# Patient Record
Sex: FEMALE | Race: WHITE | Employment: FULL TIME | URBAN - METROPOLITAN AREA
[De-identification: names, ages, dates, MRNs, and addresses within clinical notes are randomized per-mention and may not be internally consistent; named-entity substitution may affect disease eponyms.]

---

## 2017-11-29 PROBLEM — I10 BENIGN ESSENTIAL HTN: Status: ACTIVE | Noted: 2017-11-29

## 2017-12-09 PROCEDURE — 81003 URINALYSIS AUTO W/O SCOPE: CPT | Performed by: INTERNAL MEDICINE

## 2017-12-09 PROCEDURE — 86803 HEPATITIS C AB TEST: CPT | Performed by: INTERNAL MEDICINE

## 2018-01-15 PROCEDURE — 88305 TISSUE EXAM BY PATHOLOGIST: CPT | Performed by: INTERNAL MEDICINE

## 2018-02-06 PROBLEM — R30.9 PAIN WITH URINATION: Status: ACTIVE | Noted: 2018-02-06

## 2018-02-06 PROCEDURE — 87086 URINE CULTURE/COLONY COUNT: CPT | Performed by: UROLOGY

## 2018-03-08 PROBLEM — S42.292A OTHER CLOSED DISPLACED FRACTURE OF PROXIMAL END OF LEFT HUMERUS, INITIAL ENCOUNTER: Status: ACTIVE | Noted: 2018-03-08

## 2018-03-09 RX ORDER — MULTIVITAMIN WITH IRON
100 TABLET ORAL DAILY
COMMUNITY
End: 2020-11-04 | Stop reason: ALTCHOICE

## 2018-03-13 ENCOUNTER — ANESTHESIA EVENT (OUTPATIENT)
Dept: SURGERY | Facility: HOSPITAL | Age: 58
End: 2018-03-13
Payer: COMMERCIAL

## 2018-03-13 ENCOUNTER — APPOINTMENT (OUTPATIENT)
Dept: GENERAL RADIOLOGY | Facility: HOSPITAL | Age: 58
End: 2018-03-13
Attending: ORTHOPAEDIC SURGERY
Payer: COMMERCIAL

## 2018-03-13 ENCOUNTER — ANESTHESIA (OUTPATIENT)
Dept: SURGERY | Facility: HOSPITAL | Age: 58
End: 2018-03-13
Payer: COMMERCIAL

## 2018-03-13 ENCOUNTER — HOSPITAL ENCOUNTER (OUTPATIENT)
Facility: HOSPITAL | Age: 58
Setting detail: HOSPITAL OUTPATIENT SURGERY
Discharge: HOME | End: 2018-03-13
Attending: ORTHOPAEDIC SURGERY | Admitting: ORTHOPAEDIC SURGERY
Payer: COMMERCIAL

## 2018-03-13 ENCOUNTER — SURGERY (OUTPATIENT)
Age: 58
End: 2018-03-13

## 2018-03-13 VITALS
HEART RATE: 94 BPM | DIASTOLIC BLOOD PRESSURE: 89 MMHG | RESPIRATION RATE: 20 BRPM | TEMPERATURE: 98 F | BODY MASS INDEX: 35.55 KG/M2 | HEIGHT: 63 IN | SYSTOLIC BLOOD PRESSURE: 143 MMHG | OXYGEN SATURATION: 100 % | WEIGHT: 200.63 LBS

## 2018-03-13 DIAGNOSIS — S42.292A OTHER CLOSED DISPLACED FRACTURE OF PROXIMAL END OF LEFT HUMERUS, INITIAL ENCOUNTER: ICD-10-CM

## 2018-03-13 PROCEDURE — 76942 ECHO GUIDE FOR BIOPSY: CPT | Performed by: ORTHOPAEDIC SURGERY

## 2018-03-13 PROCEDURE — 0PSD04Z REPOSITION LEFT HUMERAL HEAD WITH INTERNAL FIXATION DEVICE, OPEN APPROACH: ICD-10-PCS | Performed by: ORTHOPAEDIC SURGERY

## 2018-03-13 PROCEDURE — 3E0T3BZ INTRODUCTION OF ANESTHETIC AGENT INTO PERIPHERAL NERVES AND PLEXI, PERCUTANEOUS APPROACH: ICD-10-PCS | Performed by: ANESTHESIOLOGY

## 2018-03-13 PROCEDURE — 76001 XR FLUOROSCOPE EXAM >1 HR EXTENSIVE (CPT=76001): CPT | Performed by: ORTHOPAEDIC SURGERY

## 2018-03-13 DEVICE — SCREW LOCKING 3.5X30 212.111: Type: IMPLANTABLE DEVICE | Site: HUMERUS | Status: FUNCTIONAL

## 2018-03-13 DEVICE — SCREW LOCKING 3.5X26 212.109: Type: IMPLANTABLE DEVICE | Site: HUMERUS | Status: FUNCTIONAL

## 2018-03-13 DEVICE — SCREW LOCKING 3.5X50 212.121: Type: IMPLANTABLE DEVICE | Site: HUMERUS | Status: FUNCTIONAL

## 2018-03-13 DEVICE — IMPLANTABLE DEVICE: Type: IMPLANTABLE DEVICE | Site: HUMERUS | Status: FUNCTIONAL

## 2018-03-13 DEVICE — SCREW LOCKING 3.5X40 212.117: Type: IMPLANTABLE DEVICE | Site: HUMERUS | Status: FUNCTIONAL

## 2018-03-13 DEVICE — SCREW LOCKING 3.5X36 212.115: Type: IMPLANTABLE DEVICE | Site: HUMERUS | Status: FUNCTIONAL

## 2018-03-13 DEVICE — SCREW LOCKING 3.5X28 212.110: Type: IMPLANTABLE DEVICE | Site: HUMERUS | Status: FUNCTIONAL

## 2018-03-13 RX ORDER — SODIUM CHLORIDE, SODIUM LACTATE, POTASSIUM CHLORIDE, CALCIUM CHLORIDE 600; 310; 30; 20 MG/100ML; MG/100ML; MG/100ML; MG/100ML
INJECTION, SOLUTION INTRAVENOUS CONTINUOUS
Status: DISCONTINUED | OUTPATIENT
Start: 2018-03-13 | End: 2018-03-13

## 2018-03-13 RX ORDER — ONDANSETRON 2 MG/ML
4 INJECTION INTRAMUSCULAR; INTRAVENOUS AS NEEDED
Status: DISCONTINUED | OUTPATIENT
Start: 2018-03-13 | End: 2018-03-13

## 2018-03-13 RX ORDER — CEFAZOLIN SODIUM/WATER 2 G/20 ML
2 SYRINGE (ML) INTRAVENOUS ONCE
Status: DISCONTINUED | OUTPATIENT
Start: 2018-03-13 | End: 2018-03-13 | Stop reason: HOSPADM

## 2018-03-13 RX ORDER — HYDROCODONE BITARTRATE AND ACETAMINOPHEN 5; 325 MG/1; MG/1
1 TABLET ORAL AS NEEDED
Status: DISCONTINUED | OUTPATIENT
Start: 2018-03-13 | End: 2018-03-13

## 2018-03-13 RX ORDER — HYDROCODONE BITARTRATE AND ACETAMINOPHEN 5; 325 MG/1; MG/1
2 TABLET ORAL AS NEEDED
Status: DISCONTINUED | OUTPATIENT
Start: 2018-03-13 | End: 2018-03-13

## 2018-03-13 RX ORDER — ACETAMINOPHEN 500 MG
1000 TABLET ORAL ONCE AS NEEDED
Status: DISCONTINUED | OUTPATIENT
Start: 2018-03-13 | End: 2018-03-13

## 2018-03-13 RX ORDER — MIDAZOLAM HYDROCHLORIDE 1 MG/ML
1 INJECTION INTRAMUSCULAR; INTRAVENOUS EVERY 5 MIN PRN
Status: DISCONTINUED | OUTPATIENT
Start: 2018-03-13 | End: 2018-03-13

## 2018-03-13 RX ORDER — NALOXONE HYDROCHLORIDE 0.4 MG/ML
80 INJECTION, SOLUTION INTRAMUSCULAR; INTRAVENOUS; SUBCUTANEOUS AS NEEDED
Status: DISCONTINUED | OUTPATIENT
Start: 2018-03-13 | End: 2018-03-13

## 2018-03-13 RX ORDER — MEPERIDINE HYDROCHLORIDE 25 MG/ML
12.5 INJECTION INTRAMUSCULAR; INTRAVENOUS; SUBCUTANEOUS AS NEEDED
Status: DISCONTINUED | OUTPATIENT
Start: 2018-03-13 | End: 2018-03-13

## 2018-03-13 RX ORDER — DEXAMETHASONE SODIUM PHOSPHATE 4 MG/ML
4 VIAL (ML) INJECTION AS NEEDED
Status: DISCONTINUED | OUTPATIENT
Start: 2018-03-13 | End: 2018-03-13

## 2018-03-13 NOTE — BRIEF OP NOTE
Pre-Operative Diagnosis: Other closed displaced fracture of proximal end of left humerus, initial encounter [S42.292A]     Post-Operative Diagnosis: Other closed displaced fracture of proximal end of left humerus, initial encounter [S42.292A]     Proced

## 2018-03-13 NOTE — H&P
CHIEF COMPLAINT:  Patient presents with:  Shoulder Pain: L shoulder injured it on 03/05/18 she fell at home. Pain comes and goes Norco is helping. ROM is limited and painful. XR at [facility].          HPI: Irene Carlton is a pleasant 25-year-old right-hand-dom Diabetes Father     • Diabetes Brother             Social History      Social History  Social History    Marital status: Single   Spouse name: N/A     Years of education: N/A   Number of children: N/A      Occupational History  None on file      Social His displaced fracture of proximal end of left humerus, initial encounter  -     OFFICE/OUTPT VISIT,DIPESH IBARRA III  -     ERIKA ([facility])           Assessment:   Left surgical neck proximal humerus fracture with displacement     Plan:   I reviewed the imaging

## 2018-03-13 NOTE — ANESTHESIA PREPROCEDURE EVALUATION
PRE-OP EVALUATION    Patient Name: Park Flynn    Pre-op Diagnosis: Other closed displaced fracture of proximal end of left humerus, initial encounter [S42.327A]    Procedure(s):  OPEN REDUCTION INTERNAL FIXATION LEFT PROXIMAL HUMERUS    Surgeon(s) an Surgeon: Barbra Rubio MD;  Location: [address] [place] [address]  No date: COLONOSCOPY  2012: Hunter Salguero      Comment: Screenincm proximal transverse polyp                (hyperplastic), int hemorrhoids; next

## 2018-03-13 NOTE — ANESTHESIA POSTPROCEDURE EVALUATION
[facility] Patient Status:  Hospital Outpatient Surgery   Age/Gender 59year old female MRN [id number]   [facility] SURGERY Attending Angi Jalloh MD   Hosp Day # 0 PCP Yesenia Escobedo MD       Anesthesia Post-op Note

## 2018-03-14 PROBLEM — Z47.89 ORTHOPEDIC AFTERCARE: Status: ACTIVE | Noted: 2018-03-14

## 2018-03-14 NOTE — OPERATIVE REPORT
[address]    PATIENT'S NAME: Chavo Andersonccasin   ATTENDING PHYSICIAN: Zoila Charles MD   OPERATING PHYSICIAN: Zoila Charles MD   PATIENT ACCOUNT#:   [id number]    LOCATION:  [address] [place] [address] 10  MEDICAL RECORD #:   [id number]       DATE OF BI preoperative area and the surgical site was marked. Once again, the procedure was described to the patient along with the risks and she elected to proceed. She was brought to the OR and underwent interscalene block.   She also underwent general anesthesia of the humerus just lateral to the bicipital groove over the greater tuberosity. We then placed a shaft screw, which was a 3.5 mm nonlocking screw to affix the plate to the bone.   At this point, we used our locking guide and drilled and placed our 3.5 mm

## 2018-03-28 PROBLEM — M25.512 LEFT SHOULDER PAIN, UNSPECIFIED CHRONICITY: Status: ACTIVE | Noted: 2018-03-28

## 2018-03-28 PROBLEM — S42.292D OTHER CLOSED DISPLACED FRACTURE OF PROXIMAL END OF LEFT HUMERUS WITH ROUTINE HEALING, SUBSEQUENT ENCOUNTER: Status: ACTIVE | Noted: 2018-03-28

## 2018-06-04 PROBLEM — Z98.890 S/P ORIF (OPEN REDUCTION INTERNAL FIXATION) FRACTURE: Status: ACTIVE | Noted: 2018-06-04

## 2018-06-04 PROBLEM — Z87.81 S/P ORIF (OPEN REDUCTION INTERNAL FIXATION) FRACTURE: Status: ACTIVE | Noted: 2018-06-04

## 2021-01-08 PROBLEM — Z98.890 S/P ORIF (OPEN REDUCTION INTERNAL FIXATION) FRACTURE: Status: RESOLVED | Noted: 2018-06-04 | Resolved: 2021-01-08

## 2021-01-08 PROBLEM — S42.292A OTHER CLOSED DISPLACED FRACTURE OF PROXIMAL END OF LEFT HUMERUS, INITIAL ENCOUNTER: Status: RESOLVED | Noted: 2018-03-08 | Resolved: 2021-01-08

## 2021-01-08 PROBLEM — S42.292D OTHER CLOSED DISPLACED FRACTURE OF PROXIMAL END OF LEFT HUMERUS WITH ROUTINE HEALING, SUBSEQUENT ENCOUNTER: Status: RESOLVED | Noted: 2018-03-28 | Resolved: 2021-01-08

## 2021-01-08 PROBLEM — Z87.81 S/P ORIF (OPEN REDUCTION INTERNAL FIXATION) FRACTURE: Status: RESOLVED | Noted: 2018-06-04 | Resolved: 2021-01-08

## 2021-01-08 PROBLEM — N18.30 STAGE 3 CHRONIC KIDNEY DISEASE, UNSPECIFIED WHETHER STAGE 3A OR 3B CKD (HCC): Status: ACTIVE | Noted: 2021-01-08

## 2021-01-08 PROBLEM — D64.9 MILD ANEMIA: Status: ACTIVE | Noted: 2021-01-08

## 2021-01-08 PROBLEM — Z47.89 ORTHOPEDIC AFTERCARE: Status: RESOLVED | Noted: 2018-03-14 | Resolved: 2021-01-08

## 2021-03-23 PROBLEM — D50.0 IRON DEFICIENCY ANEMIA SECONDARY TO BLOOD LOSS (CHRONIC): Status: ACTIVE | Noted: 2021-03-23

## 2021-05-24 PROBLEM — N31.9 NEUROGENIC BLADDER: Status: ACTIVE | Noted: 2021-05-24

## 2021-06-07 PROBLEM — K59.09 CHRONIC CONSTIPATION: Status: ACTIVE | Noted: 2021-06-07

## 2021-11-03 PROBLEM — Z47.89 AFTERCARE FOLLOWING SURGERY OF THE MUSCULOSKELETAL SYSTEM: Status: ACTIVE | Noted: 2021-11-03

## 2022-03-16 PROBLEM — M81.8 OTHER OSTEOPOROSIS WITHOUT CURRENT PATHOLOGICAL FRACTURE: Status: ACTIVE | Noted: 2022-03-16

## 2024-04-24 RX ORDER — ACETAMINOPHEN 500 MG
500 TABLET ORAL EVERY 6 HOURS PRN
COMMUNITY

## 2024-04-25 ENCOUNTER — ANESTHESIA EVENT (OUTPATIENT)
Dept: SURGERY | Facility: HOSPITAL | Age: 64
End: 2024-04-25
Payer: COMMERCIAL

## 2024-04-26 ENCOUNTER — APPOINTMENT (OUTPATIENT)
Dept: GENERAL RADIOLOGY | Facility: HOSPITAL | Age: 64
End: 2024-04-26
Attending: ORTHOPAEDIC SURGERY
Payer: COMMERCIAL

## 2024-04-26 ENCOUNTER — ANESTHESIA (OUTPATIENT)
Dept: SURGERY | Facility: HOSPITAL | Age: 64
End: 2024-04-26
Payer: COMMERCIAL

## 2024-04-26 ENCOUNTER — HOSPITAL ENCOUNTER (OUTPATIENT)
Facility: HOSPITAL | Age: 64
Discharge: HOME | End: 2024-04-27
Attending: ORTHOPAEDIC SURGERY | Admitting: ORTHOPAEDIC SURGERY
Payer: COMMERCIAL

## 2024-04-26 DIAGNOSIS — Z87.81 STATUS POST OPEN REDUCTION WITH INTERNAL FIXATION (ORIF) OF FRACTURE OF ANKLE: Primary | ICD-10-CM

## 2024-04-26 DIAGNOSIS — Z98.890 STATUS POST OPEN REDUCTION WITH INTERNAL FIXATION (ORIF) OF FRACTURE OF ANKLE: Primary | ICD-10-CM

## 2024-04-26 LAB — MRSA DNA SPEC QL NAA+PROBE: NEGATIVE

## 2024-04-26 PROCEDURE — 0MQR0ZZ REPAIR LEFT ANKLE BURSA AND LIGAMENT, OPEN APPROACH: ICD-10-PCS | Performed by: ORTHOPAEDIC SURGERY

## 2024-04-26 PROCEDURE — 0SSG04Z REPOSITION LEFT ANKLE JOINT WITH INTERNAL FIXATION DEVICE, OPEN APPROACH: ICD-10-PCS | Performed by: ORTHOPAEDIC SURGERY

## 2024-04-26 PROCEDURE — 64450 NJX AA&/STRD OTHER PN/BRANCH: CPT | Performed by: ORTHOPAEDIC SURGERY

## 2024-04-26 PROCEDURE — 76000 FLUOROSCOPY <1 HR PHYS/QHP: CPT | Performed by: ORTHOPAEDIC SURGERY

## 2024-04-26 PROCEDURE — 76942 ECHO GUIDE FOR BIOPSY: CPT | Performed by: ORTHOPAEDIC SURGERY

## 2024-04-26 PROCEDURE — 87641 MR-STAPH DNA AMP PROBE: CPT | Performed by: ORTHOPAEDIC SURGERY

## 2024-04-26 DEVICE — TIGHTROPE XP BUTTRESS PLATE IMPLANT SYS
Type: IMPLANTABLE DEVICE | Site: ANKLE | Status: FUNCTIONAL
Brand: ARTHREX®

## 2024-04-26 DEVICE — IMPLANTABLE DEVICE: Type: IMPLANTABLE DEVICE | Site: ANKLE | Status: FUNCTIONAL

## 2024-04-26 RX ORDER — METOCLOPRAMIDE HYDROCHLORIDE 5 MG/ML
10 INJECTION INTRAMUSCULAR; INTRAVENOUS ONCE
Status: COMPLETED | OUTPATIENT
Start: 2024-04-26 | End: 2024-04-26

## 2024-04-26 RX ORDER — DOXEPIN HYDROCHLORIDE 50 MG/1
1 CAPSULE ORAL DAILY
Status: DISCONTINUED | OUTPATIENT
Start: 2024-04-27 | End: 2024-04-27

## 2024-04-26 RX ORDER — ASPIRIN 81 MG/1
81 TABLET ORAL DAILY
Status: DISCONTINUED | OUTPATIENT
Start: 2024-04-26 | End: 2024-04-27

## 2024-04-26 RX ORDER — SODIUM CHLORIDE, SODIUM LACTATE, POTASSIUM CHLORIDE, CALCIUM CHLORIDE 600; 310; 30; 20 MG/100ML; MG/100ML; MG/100ML; MG/100ML
INJECTION, SOLUTION INTRAVENOUS CONTINUOUS
Status: DISCONTINUED | OUTPATIENT
Start: 2024-04-26 | End: 2024-04-27

## 2024-04-26 RX ORDER — SODIUM CHLORIDE, SODIUM LACTATE, POTASSIUM CHLORIDE, CALCIUM CHLORIDE 600; 310; 30; 20 MG/100ML; MG/100ML; MG/100ML; MG/100ML
INJECTION, SOLUTION INTRAVENOUS CONTINUOUS
Status: DISCONTINUED | OUTPATIENT
Start: 2024-04-26 | End: 2024-04-26 | Stop reason: HOSPADM

## 2024-04-26 RX ORDER — NALOXONE HYDROCHLORIDE 0.4 MG/ML
0.08 INJECTION, SOLUTION INTRAMUSCULAR; INTRAVENOUS; SUBCUTANEOUS AS NEEDED
Status: DISCONTINUED | OUTPATIENT
Start: 2024-04-26 | End: 2024-04-26 | Stop reason: HOSPADM

## 2024-04-26 RX ORDER — DOCUSATE SODIUM 100 MG/1
100 CAPSULE, LIQUID FILLED ORAL 2 TIMES DAILY
Status: DISCONTINUED | OUTPATIENT
Start: 2024-04-26 | End: 2024-04-27

## 2024-04-26 RX ORDER — ONDANSETRON 2 MG/ML
INJECTION INTRAMUSCULAR; INTRAVENOUS AS NEEDED
Status: DISCONTINUED | OUTPATIENT
Start: 2024-04-26 | End: 2024-04-26 | Stop reason: SURG

## 2024-04-26 RX ORDER — METOCLOPRAMIDE 10 MG/1
10 TABLET ORAL ONCE
Status: COMPLETED | OUTPATIENT
Start: 2024-04-26 | End: 2024-04-26

## 2024-04-26 RX ORDER — CEFAZOLIN SODIUM/WATER 2 G/20 ML
2 SYRINGE (ML) INTRAVENOUS EVERY 8 HOURS
Qty: 40 ML | Refills: 0 | Status: COMPLETED | OUTPATIENT
Start: 2024-04-26 | End: 2024-04-27

## 2024-04-26 RX ORDER — METOCLOPRAMIDE HYDROCHLORIDE 5 MG/ML
10 INJECTION INTRAMUSCULAR; INTRAVENOUS EVERY 8 HOURS PRN
Status: DISCONTINUED | OUTPATIENT
Start: 2024-04-26 | End: 2024-04-27

## 2024-04-26 RX ORDER — ROPIVACAINE HYDROCHLORIDE 5 MG/ML
INJECTION, SOLUTION EPIDURAL; INFILTRATION; PERINEURAL
Status: COMPLETED | OUTPATIENT
Start: 2024-04-26 | End: 2024-04-26

## 2024-04-26 RX ORDER — FAMOTIDINE 10 MG/ML
20 INJECTION, SOLUTION INTRAVENOUS ONCE
Status: COMPLETED | OUTPATIENT
Start: 2024-04-26 | End: 2024-04-26

## 2024-04-26 RX ORDER — HYDROCODONE BITARTRATE AND ACETAMINOPHEN 5; 325 MG/1; MG/1
1 TABLET ORAL EVERY 4 HOURS PRN
Status: DISCONTINUED | OUTPATIENT
Start: 2024-04-26 | End: 2024-04-26 | Stop reason: HOSPADM

## 2024-04-26 RX ORDER — MELATONIN
3 NIGHTLY PRN
Status: DISCONTINUED | OUTPATIENT
Start: 2024-04-26 | End: 2024-04-27

## 2024-04-26 RX ORDER — ROSUVASTATIN CALCIUM 10 MG/1
10 TABLET, COATED ORAL NIGHTLY
Status: DISCONTINUED | OUTPATIENT
Start: 2024-04-26 | End: 2024-04-27

## 2024-04-26 RX ORDER — LIDOCAINE HYDROCHLORIDE 10 MG/ML
INJECTION, SOLUTION EPIDURAL; INFILTRATION; INTRACAUDAL; PERINEURAL AS NEEDED
Status: DISCONTINUED | OUTPATIENT
Start: 2024-04-26 | End: 2024-04-26 | Stop reason: SURG

## 2024-04-26 RX ORDER — DEXAMETHASONE SODIUM PHOSPHATE 10 MG/ML
INJECTION, SOLUTION INTRAMUSCULAR; INTRAVENOUS
Status: COMPLETED | OUTPATIENT
Start: 2024-04-26 | End: 2024-04-26

## 2024-04-26 RX ORDER — ROSUVASTATIN CALCIUM 10 MG/1
10 TABLET, COATED ORAL NIGHTLY
COMMUNITY
Start: 2024-03-26

## 2024-04-26 RX ORDER — BISACODYL 10 MG
10 SUPPOSITORY, RECTAL RECTAL
Status: DISCONTINUED | OUTPATIENT
Start: 2024-04-26 | End: 2024-04-27

## 2024-04-26 RX ORDER — HYDROMORPHONE HYDROCHLORIDE 1 MG/ML
0.2 INJECTION, SOLUTION INTRAMUSCULAR; INTRAVENOUS; SUBCUTANEOUS EVERY 5 MIN PRN
Status: DISCONTINUED | OUTPATIENT
Start: 2024-04-26 | End: 2024-04-26 | Stop reason: HOSPADM

## 2024-04-26 RX ORDER — POLYETHYLENE GLYCOL 3350 17 G/17G
17 POWDER, FOR SOLUTION ORAL DAILY PRN
Status: DISCONTINUED | OUTPATIENT
Start: 2024-04-26 | End: 2024-04-27

## 2024-04-26 RX ORDER — ROCURONIUM BROMIDE 10 MG/ML
INJECTION, SOLUTION INTRAVENOUS AS NEEDED
Status: DISCONTINUED | OUTPATIENT
Start: 2024-04-26 | End: 2024-04-26 | Stop reason: SURG

## 2024-04-26 RX ORDER — FAMOTIDINE 20 MG/1
20 TABLET, FILM COATED ORAL ONCE
Status: COMPLETED | OUTPATIENT
Start: 2024-04-26 | End: 2024-04-26

## 2024-04-26 RX ORDER — SENNOSIDES 8.6 MG
17.2 TABLET ORAL NIGHTLY
Status: DISCONTINUED | OUTPATIENT
Start: 2024-04-26 | End: 2024-04-27

## 2024-04-26 RX ORDER — ONDANSETRON 2 MG/ML
4 INJECTION INTRAMUSCULAR; INTRAVENOUS EVERY 6 HOURS PRN
Status: DISCONTINUED | OUTPATIENT
Start: 2024-04-26 | End: 2024-04-27

## 2024-04-26 RX ORDER — HYDROCODONE BITARTRATE AND ACETAMINOPHEN 5; 325 MG/1; MG/1
2 TABLET ORAL EVERY 6 HOURS PRN
Status: DISCONTINUED | OUTPATIENT
Start: 2024-04-26 | End: 2024-04-27

## 2024-04-26 RX ORDER — HYDROCODONE BITARTRATE AND ACETAMINOPHEN 5; 325 MG/1; MG/1
2 TABLET ORAL EVERY 4 HOURS PRN
Status: DISCONTINUED | OUTPATIENT
Start: 2024-04-26 | End: 2024-04-26 | Stop reason: HOSPADM

## 2024-04-26 RX ORDER — ACETAMINOPHEN 500 MG
1000 TABLET ORAL ONCE
Status: COMPLETED | OUTPATIENT
Start: 2024-04-26 | End: 2024-04-26

## 2024-04-26 RX ORDER — HYDROMORPHONE HYDROCHLORIDE 1 MG/ML
0.6 INJECTION, SOLUTION INTRAMUSCULAR; INTRAVENOUS; SUBCUTANEOUS EVERY 5 MIN PRN
Status: DISCONTINUED | OUTPATIENT
Start: 2024-04-26 | End: 2024-04-26 | Stop reason: HOSPADM

## 2024-04-26 RX ORDER — MELATONIN
2000 DAILY
Status: DISCONTINUED | OUTPATIENT
Start: 2024-04-26 | End: 2024-04-27

## 2024-04-26 RX ORDER — ENEMA 19; 7 G/133ML; G/133ML
1 ENEMA RECTAL ONCE AS NEEDED
Status: DISCONTINUED | OUTPATIENT
Start: 2024-04-26 | End: 2024-04-27

## 2024-04-26 RX ORDER — HYDROMORPHONE HYDROCHLORIDE 1 MG/ML
0.4 INJECTION, SOLUTION INTRAMUSCULAR; INTRAVENOUS; SUBCUTANEOUS EVERY 5 MIN PRN
Status: DISCONTINUED | OUTPATIENT
Start: 2024-04-26 | End: 2024-04-26 | Stop reason: HOSPADM

## 2024-04-26 RX ORDER — CEFAZOLIN SODIUM/WATER 2 G/20 ML
2 SYRINGE (ML) INTRAVENOUS ONCE
Status: COMPLETED | OUTPATIENT
Start: 2024-04-26 | End: 2024-04-26

## 2024-04-26 RX ORDER — MIDAZOLAM HYDROCHLORIDE 1 MG/ML
INJECTION INTRAMUSCULAR; INTRAVENOUS
Status: COMPLETED | OUTPATIENT
Start: 2024-04-26 | End: 2024-04-26

## 2024-04-26 RX ORDER — HYDROCODONE BITARTRATE AND ACETAMINOPHEN 5; 325 MG/1; MG/1
1 TABLET ORAL EVERY 4 HOURS PRN
Status: DISCONTINUED | OUTPATIENT
Start: 2024-04-26 | End: 2024-04-27

## 2024-04-26 RX ADMIN — MIDAZOLAM HYDROCHLORIDE 2 MG: 1 INJECTION INTRAMUSCULAR; INTRAVENOUS at 10:20:00

## 2024-04-26 RX ADMIN — SODIUM CHLORIDE, SODIUM LACTATE, POTASSIUM CHLORIDE, CALCIUM CHLORIDE: 600; 310; 30; 20 INJECTION, SOLUTION INTRAVENOUS at 12:27:00

## 2024-04-26 RX ADMIN — ROPIVACAINE HYDROCHLORIDE 25 ML: 5 INJECTION, SOLUTION EPIDURAL; INFILTRATION; PERINEURAL at 10:20:00

## 2024-04-26 RX ADMIN — DEXAMETHASONE SODIUM PHOSPHATE 10 MG: 10 INJECTION, SOLUTION INTRAMUSCULAR; INTRAVENOUS at 10:20:00

## 2024-04-26 RX ADMIN — ROCURONIUM BROMIDE 50 MG: 10 INJECTION, SOLUTION INTRAVENOUS at 10:44:00

## 2024-04-26 RX ADMIN — LIDOCAINE HYDROCHLORIDE 50 MG: 10 INJECTION, SOLUTION EPIDURAL; INFILTRATION; INTRACAUDAL; PERINEURAL at 10:44:00

## 2024-04-26 RX ADMIN — CEFAZOLIN SODIUM/WATER 2 G: 2 G/20 ML SYRINGE (ML) INTRAVENOUS at 10:58:00

## 2024-04-26 RX ADMIN — ROPIVACAINE HYDROCHLORIDE 15 ML: 5 INJECTION, SOLUTION EPIDURAL; INFILTRATION; PERINEURAL at 10:20:00

## 2024-04-26 RX ADMIN — ONDANSETRON 4 MG: 2 INJECTION INTRAMUSCULAR; INTRAVENOUS at 12:09:00

## 2024-04-26 NOTE — ANESTHESIA PROCEDURE NOTES
Peripheral Block    Date/Time: 4/26/2024 10:20 AM    Performed by: Eli Gann MD  Authorized by: Eli Gann MD      General Information and Staff    Start Time:  4/26/2024 10:20 AM  End Time:  4/26/2024 10:35 AM  Anesthesiologist:  Eli Gann MD  Performed by:  Anesthesiologist  Patient Location:  PACU    Block Placement: Pre Induction  Site Identification: nerve stimulator and image stored and retrievable      Reason for Block: post-op pain management    Preanesthetic Checklist: 2 patient identifers, IV checked, risks and benefits discussed, monitors and equipment checked, pre-op evaluation, timeout performed, anesthesia consent, sterile technique used, no prohibitive neurological deficits and no local skin infection at insertion site      Procedure Details    Patient Position:  Prone  Prep: ChloraPrep    Monitoring:  Heart rate, cardiac monitor, continuous pulse ox and blood pressure cuff  Block Type:  Popliteal  Laterality:  Left  Injection Technique:  Single-shot    Needle    Needle Type:  Short-bevel  Needle Gauge:  22 G  Needle Length:  50 mm  Needle Localization:  Anatomical landmarks, nerve stimulator and ultrasound guidance  Reason for Ultrasound Use: appropriate spread of the medication was noted in real time and no ultrasound evidence of intravascular and/or intraneural injection            Assessment    Injection Assessment:  Good spread noted, incremental injection, local visualized surrounding nerve on ultrasound, low pressure, negative aspiration for heme, negative resistance and no pain on injection  Paresthesia Pain:  None  Heart Rate Change: No        Medications  4/26/2024 10:20 AM  midazolam (VERSED)injection 2mg/2ml - Intravenous   2 mg - 4/26/2024 10:20:00 AM  ropivacaine (NAROPIN) injection 0.5% - Infiltration   25 mL - 4/26/2024 10:20:00 AM  dexamethasone (DECADRON) PF injection 10 mg/ml - Injection   10 mg - 4/26/2024 10:20:00 AM    Additional Comments

## 2024-04-26 NOTE — ANESTHESIA POSTPROCEDURE EVALUATION
Patient: Christina Beal    Procedure Summary       Date: 04/26/24 Room / Location: [facility] MAIN OR  / [facility] MAIN OR    Anesthesia Start: 1040 Anesthesia Stop: 1227    Procedure: Left ankle syndesmotic reduction and fixation, possible fibula open reduction internal fixation (Left: Foot) Diagnosis: (Closed displaced maisonneuve fracture left lower extremity, bony abnormality)    Surgeons: Tyrone Leal MD Anesthesiologist: Eli Gann MD    Anesthesia Type: general, regional ASA Status: 3            Anesthesia Type: general, regional    Vitals Value Taken Time   /64 04/26/24 1226   Temp 97.6 °F (36.4 °C) 04/26/24 1226   Pulse 63 04/26/24 1229   Resp 12 04/26/24 1229   SpO2 94 % 04/26/24 1229   Vitals shown include unfiled device data.    [facility] AN Post Evaluation:   Patient Evaluated in PACU  Patient Participation: complete - patient cannot participate  Level of Consciousness: awake  Pain Score: 0  Pain Management: adequate  Airway Patency:patent  Yes    Nausea/Vomiting: none  Cardiovascular Status: acceptable  Respiratory Status: acceptable  Postoperative Hydration acceptable      Eli Gann MD  4/26/2024 12:29 PM

## 2024-04-26 NOTE — ANESTHESIA PROCEDURE NOTES
Peripheral Block    Date/Time: 4/26/2024 10:20 AM    Performed by: Eli Gann MD  Authorized by: Eli Gann MD      General Information and Staff    Start Time:  4/26/2024 10:20 AM  End Time:  4/26/2024 10:35 AM  Anesthesiologist:  Eli Gann MD  Performed by:  Anesthesiologist  Patient Location:  PACU    Block Placement: Pre Induction  Site Identification: real time ultrasound guided, nerve stimulator, surface landmarks and image stored and retrievable    Block site/laterality marked before start: site marked  Preanesthetic Checklist: 2 patient identifers, IV checked, risks and benefits discussed, monitors and equipment checked, pre-op evaluation, timeout performed, anesthesia consent, sterile technique used, no prohibitive neurological deficits and no local skin infection at insertion site      Procedure Details    Patient Position:  Supine  Prep: ChloraPrep    Monitoring:  Heart rate, cardiac monitor, continuous pulse ox and blood pressure cuff  Block Type:  Saphenous  Laterality:  Left  Injection Technique:  Single-shot    Needle    Needle Type:  Short-bevel  Needle Gauge:  22 G  Needle Localization:  Ultrasound guidance              Assessment    Injection Assessment:  Good spread noted, incremental injection, local visualized surrounding nerve on ultrasound, low pressure, negative aspiration for heme, negative resistance and no pain on injection  Paresthesia Pain:  None      Medications  4/26/2024 10:20 AM  ropivacaine (NAROPIN) injection 0.5% - Infiltration   15 mL - 4/26/2024 10:20:00 AM    Additional Comments

## 2024-04-26 NOTE — ANESTHESIA PROCEDURE NOTES
Airway  Date/Time: 4/26/2024 10:45 AM  Urgency: elective    Airway not difficult    General Information and Staff    Patient location during procedure: OR  Anesthesiologist: Eli Gann MD  Performed: anesthesiologist   Performed by: Eli Gann MD  Authorized by: Eli Gann MD      Indications and Patient Condition  Indications for airway management: anesthesia  Sedation level: deep  Preoxygenated: yes  Patient position: sniffing  Mask difficulty assessment: 1 - vent by mask    Final Airway Details  Final airway type: endotracheal airway      Successful airway: ETT  Cuffed: yes   Successful intubation technique: Video laryngoscopy  Endotracheal tube insertion site: oral  ETT size (mm): 7.0    Placement verified by: capnometry   Measured from: lips  ETT to lips (cm): 21  Number of attempts at approach: 1  Number of other approaches attempted: 0

## 2024-04-26 NOTE — DISCHARGE INSTRUCTIONS
Home Care Instructions Following Your Ankle Fixation       Weight bearing status  You will be non-weight bearing on your operative extremity for the next 8 weeks.  You will use crutches for ambulation assistance while not weight bearing.  Crutches will be provided to you from the hospital.    Splint instructions  Remain in your splint at all times until removed at your 1st post operative visit.  Keep splint clean and dry.  You cannot shower until after your 1st post operative visit.    Wound Care  The following instructions will promote proper healing and help to prevent infection.  Our office staff will remove your splint and sutures at your first post-operative appointment:   You can shower and get the incisions wet after your 1st post operative visit. Pat dry after your shower.  No submerging incisions under water for  at least 4 weeks after surgery and until after cleared by Dr. Leal.    Cold Therapy  Cold therapy will help minimize swelling, improve, comfort, and limit the need for pain medication.  Apply ice 2-3 times daily for the 1st 2 weeks after surgery.  The ice bag should never come in direct contact with the skin, place a towel between the ice bag and your skin.  Immediately contact Dr. Leal if you experience excruciating pain not helped by pain medication, burning, blisters, redness, discoloration, or other changes in skin appearance.    Pain Medication: Norco  Norco 5/325mg: Take one or two tablets every six hours as needed for pain.  Do not exceed 10 (ten) tablets each day.  Do not take Norco, if you do not have pain.  You may take NSAIDs (Advil, Aleve) to assist with pain control if the Norco is not giving you maximum pain relief. It is okay to take NSAIDs with Norco.  You should not take Tylenol with Norco, as Norco already contains Tylenol, and too much Tylenol can be very dangerous for your body.    Deep Vein Thrombosis (DVT) Prophylaxis  You are at increased risk for developing a DVT or lower  extremity blood clot after a lower extremity surgery.   Signs and symptoms of a DVT include calf swelling, calf pain, and calf tenderness to palpation.  If you experience severe calf pain, calf tenderness or calf swelling, you should go to the nearest urgent care or emergency room immediately for a STAT ultrasound to assess for a blood clot.  Keys to prevention of  DVT are performing ankle pumps (moving your ankle in an up and down motion) multiple times throughout the day for the first 1-2 weeks post-op.  You will be prescribed Aspirin 81mg daily post operatively.  You should take this daily for the first 6 weeks after surgery.    Home Medication  Resume your home medications as instructed.    Diet  Resume your normal diet.    Activity  No strenuous activity. Again, you will be non-weight bearing post-operatively.   Use your crutches while non-weight bearing and while in your splint.  You can go up and down the stairs as tolerated while in your splint. Use common sense.  You cannot return to work, if your work requires strenuous activity with your legs.   You cannot return to any sports or exercises involving your legs until Dr. Leal gives you permission.    Driving  Do not drive if you are taking pain medication. Do not drive while in the splint and using crutches.    Follow-up Appointment with Dr. Leal  You were likely given a postoperative appointment with Dr. Leal at the time your operation was scheduled.  Call Dr. Leal's office today for an appointment. You should be seen 10-14 days after your surgery. Call either [phone] for an appointment.  Verify your appointment date, day, time, and location.  At your 1st postoperative office visit: Your wounds will be evaluated, sutures removed, healing assessed, physical therapy will be ordered and scheduled and any additional concerns and instructions will be discussed.    Questions or Concerns  Call Dr. Leal's office if you experience severe pain not controlled by  pain medication, swelling, numbness, tingling, bleeding, fever, or other concerns.   If your call is made after office hours, a physician's assistant or nurse practitioner will be available to help you. There is always a surgeon covering Dr. Leal's patients, if he is unavailable.    Sometimes managing your health at home requires assistance.  The [facility] team has recognized your preference to use [facility].  They can be reached by phone at [phone].  The fax number for your reference is [phone].  A representative from the home health agency will contact you or your family to schedule your first visit.

## 2024-04-26 NOTE — ANESTHESIA PREPROCEDURE EVALUATION
Anesthesia PreOp Note    HPI:     Christina Beal is a 64 year old female who presents for preoperative consultation requested by: Tyrone Leal MD    Date of Surgery: 4/26/2024    Procedure(s):  Left ankle syndesmotic reduction and fixation, possible fibula open reduction internal fixation  Indication: Closed displaced maisonneuve fracture left lower extremity, bony abnormality    Relevant Problems   No relevant active problems       NPO:  Last Liquid Consumption Date: 04/25/24  Last Liquid Consumption Time: 1900  Last Solid Consumption Date: 04/25/24  Last Solid Consumption Time: 1900  Last Liquid Consumption Date: 04/25/24          History Review:  Patient Active Problem List    Diagnosis Date Noted    Other osteoporosis without current pathological fracture 03/16/2022    s/p lapidus bunionectomy and weil osteotomy second metatarsal right foot on 9/23/2021. LAUAR 12/22/2021 11/03/2021    Chronic constipation 06/07/2021    Neurogenic bladder 05/24/2021    Iron deficiency anemia secondary to blood loss (chronic) 03/23/2021    Stage 3 chronic kidney disease, unspecified whether stage 3a or 3b CKD (HCC) 01/08/2021    Mild anemia 01/08/2021    Left shoulder pain, unspecified chronicity 03/28/2018    Pain with urination 02/06/2018    Benign essential HTN- has not agreed to medications 11/29/2017    Varicose veins of leg with pain 11/20/2012    Obesity 10/31/2012       Past Medical History:    Anxiety state    Benign essential HTN- has not agreed to medications    Chronic rhinitis    Elevated BP    Frequent urination    High blood pressure    Osteoarthritis    Renal disorder    pt self cath 4-5 times daily    Self-catheterizes urinary bladder    self caths 5 x a day     Urinary problem in female    Urinary retention    Visual impairment    wear glasses       Past Surgical History:   Procedure Laterality Date    Colonoscopy N/A 1/13/2018    Procedure: COLONOSCOPY, POSSIBLE BIOPSY, POSSIBLE POLYPECTOMY 80892;  Surgeon:  Dexter Castellanos MD;  Location: [facility]    Colonoscopy      Colonoscopy,rodrigo moraes,snare  2012    Screenincm proximal transverse polyp (hyperplastic), int hemorrhoids; next colonoscopy in 5 years; Procedure: COLONOSCOPY, POSSIBLE BIOPSY, POSSIBLE POLYPECTOMY 06137;  Surgeon: Frank Chase MD;  Location: [facility]    Cystoscopy,insert ureteral stent      Implantable elect nerve stim      for bladder /  REMOVED     Other surgical history  03/15/2021    Cystoscopy Dr. Betts    Other surgical history  2021    stage 1,  [employer] sacral neuromodulation placement - Dr. Mccormack       Medications Prior to Admission   Medication Sig Dispense Refill Last Dose    IBUPROFEN OR Take 1 tablet by mouth daily.   2024    acetaminophen 500 MG Oral Tab Take 1 tablet (500 mg total) by mouth every 6 (six) hours as needed for Pain.   2024 at 2100    Multiple Vitamin (MULTI-VITAMIN) Oral Tab Take 1 tablet by mouth daily.   2024    Calcium Carbonate 600 MG Oral Tab Take 1 tablet (600 mg total) by mouth in the morning and 1 tablet (600 mg total) before bedtime.   2024    Cholecalciferol (VITAMIN D) 50 MCG (2000 UT) Oral Tab Take 2000U daily (Patient taking differently: 2 (two) times a day. Take 2000U daily) 30 tablet 0 2024    metoprolol succinate 50 MG Oral Tablet 24 Hr Take 1.5 tablets (75 mg total) by mouth daily. 135 tablet 3 2024 at 0800     Current Facility-Administered Medications Ordered in Epic   Medication Dose Route Frequency Provider Last Rate Last Admin    lactated ringers infusion   Intravenous Continuous Tyrone Leal MD 20 mL/hr at 24 0844 New Bag at 24 0844    metoprolol tartrate (Lopressor) tab 25 mg  25 mg Oral Once PRN Tyrone Leal MD        mupirocin (Bactroban) 2% nasal ointment 1 Application  1 Application Each Nare BID Tyrone Leal MD   1 Application at 24 0844    ceFAZolin (Ancef) 2 g in 20mL IV syringe premix  2 g  Intravenous Once Elvis, MD Tyrone         No current [facility]-ordered outpatient medications on file.       No Known Allergies    Family History   Problem Relation Age of Onset    Diabetes Father     Diabetes Brother      Social History     Socioeconomic History    Marital status: Single   Tobacco Use    Smoking status: Never    Smokeless tobacco: Never    Tobacco comments:     quit in 1977   Vaping Use    Vaping status: Never Used   Substance and Sexual Activity    Alcohol use: Yes     Alcohol/week: 8.0 - 10.0 standard drinks of alcohol     Types: 8 - 10 Standard drinks or equivalent per week     Comment: week end    Drug use: No   Other Topics Concern    Seat Belt Yes       Available pre-op labs reviewed.             Vital Signs:  Body mass index is 36.31 kg/m².   height is 1.6 m (5' 3\") and weight is 93 kg (205 lb). Her oral temperature is 97.4 °F (36.3 °C). Her blood pressure is 121/58 and her pulse is 58. Her respiration is 16 and oxygen saturation is 97%.   Vitals:    04/24/24 1225 04/26/24 0840   BP:  121/58   Pulse:  58   Resp:  16   Temp:  97.4 °F (36.3 °C)   TempSrc:  Oral   SpO2:  97%   Weight: 93 kg (205 lb) 93 kg (205 lb)   Height: 1.6 m (5' 3\") 1.6 m (5' 3\")        Anesthesia Evaluation     Patient summary reviewed and Nursing notes reviewed    Airway   Mallampati: III  TM distance: <3 FB  Dental      Pulmonary     breath sounds clear to auscultation  Cardiovascular   (+) hypertension    Rhythm: regular  Rate: normal    Neuro/Psych    (+)  anxiety/panic attacks,        GI/Hepatic/Renal    (+) chronic renal disease CRI    Endo/Other    Abdominal                  Anesthesia Plan:   ASA:  3  Plan:   General and regional  Airway:  ETT  Post-op Pain Management: Popliteal block and Saphenous block  Informed Consent Plan and Risks Discussed With:  Patient  Discussed plan with:  Attending      I have informed Christina Beal and/or legal guardian or family member of the nature of the anesthetic plan, benefits,  risks including possible dental damage if relevant, major complications, and any alternative forms of anesthetic management.   All of the patient's questions were answered to the best of my ability. The patient desires the anesthetic management as planned.  Eli Gann MD  4/26/2024 9:52 AM  Present on Admission:  **None**

## 2024-04-26 NOTE — H&P
[facility] Hospitalist H&P       CC: left ankle fracture       PCP: Charlotte Nagy MD    History of Present Illness: Ms. pollard is a 64 year old female with PMH sig for HTN, HLD, who presents left ankle repair.  Patient is currently post op denies pain, no Cp/sob, no n/v/d, no f/c/ns.        PMH  Past Medical History:    Anxiety state    Benign essential HTN- has not agreed to medications    Chronic rhinitis    Elevated BP    Frequent urination    High blood pressure    Osteoarthritis    Renal disorder    pt self cath 4-5 times daily    Self-catheterizes urinary bladder    self caths 5 x a day     Urinary problem in female    Urinary retention    Visual impairment    wear glasses        PSH  Past Surgical History:   Procedure Laterality Date    Colonoscopy N/A 2018    Procedure: COLONOSCOPY, POSSIBLE BIOPSY, POSSIBLE POLYPECTOMY 74596;  Surgeon: Dexter Castellanos MD;  Location: [facility]    Colonoscopy      Colonoscopy,remv lesn,snare  2012    Screenincm proximal transverse polyp (hyperplastic), int hemorrhoids; next colonoscopy in 5 years; Procedure: COLONOSCOPY, POSSIBLE BIOPSY, POSSIBLE POLYPECTOMY 39330;  Surgeon: Frank Chase MD;  Location: [facility]    Cystoscopy,insert ureteral stent      Implantable elect nerve stim      for bladder /  REMOVED     Other surgical history  03/15/2021    Cystoscopy Dr. Betts    Other surgical history  2021    stage 1,  axonics sacral neuromodulation placement - Dr. Mccormack        ALL:  No Known Allergies     Home Medications:  Outpatient Medications Marked as Taking for the 24 encounter (Hospital Encounter)   Medication Sig Dispense Refill    rosuvastatin 10 MG Oral Tab Take 1 tablet (10 mg total) by mouth nightly.      IBUPROFEN OR Take 1 tablet by mouth daily.      acetaminophen 500 MG Oral Tab Take 1 tablet (500 mg total) by mouth every 6 (six) hours as needed for Pain.      Multiple Vitamin  (MULTI-VITAMIN) Oral Tab Take 1 tablet by mouth daily.      Calcium Carbonate 600 MG Oral Tab Take 1 tablet (600 mg total) by mouth in the morning and 1 tablet (600 mg total) before bedtime.      Cholecalciferol (VITAMIN D) 50 MCG (2000 UT) Oral Tab Take 2000U daily (Patient taking differently: 2 (two) times a day. Take 2000U daily) 30 tablet 0    metoprolol succinate 50 MG Oral Tablet 24 Hr Take 1.5 tablets (75 mg total) by mouth daily. 135 tablet 3         Soc Hx  Social History     Tobacco Use    Smoking status: Never    Smokeless tobacco: Never    Tobacco comments:     quit in 1977   Substance Use Topics    Alcohol use: Yes     Alcohol/week: 8.0 - 10.0 standard drinks of alcohol     Types: 8 - 10 Standard drinks or equivalent per week     Comment: week end        Fam Hx  Family History   Problem Relation Age of Onset    Diabetes Father     Diabetes Brother        Review of Systems  Comprehensive ROS reviewed and negative except for what's stated above.     OBJECTIVE:  /51 (BP Location: Right arm)   Pulse 70   Temp 98.2 °F (36.8 °C) (Oral)   Resp 18   Ht 5' 3\" (1.6 m)   Wt 205 lb (93 kg)   LMP  (LMP Unknown)   SpO2 97%   BMI 36.31 kg/m²   General:  Alert, no distress   Head:  Normocephalic, without obvious abnormality, atraumatic.   Eyes:  Sclera anicteric, No conjunctival pallor,     Nose: Nares normal. Septum midline. Mucosa normal. No drainage.   Throat: Lips, mucosa, and tongue normal. Teeth and gums normal.   Neck: Supple,     Lungs:   Clear to auscultation bilaterally. Normal effort   Chest wall:  No tenderness or deformity.   Heart:  Regular rate and rhythm, S1, S2 normal, no murmur, rub or gallop appreciated   Abdomen:   Soft, non-tender. Bowel sounds normal. No masses,  No organomegaly. Non distended   Extremities: Extremities left foot with dressing in place   Skin: Skin color, texture, turgor normal. No rashes or lesions.    Neurologic: Normal strength, no focal deficit appreciated      Diagnostic Data:    CBC/Chem  No results for input(s): \"WBC\", \"HGB\", \"MCV\", \"PLT\", \"BAND\", \"INR\" in the last 168 hours.    Invalid input(s): \"LYM#\", \"MONO#\", \"BASOS#\", \"EOSIN#\"    No results for input(s): \"NA\", \"K\", \"CL\", \"CO2\", \"BUN\", \"CREATSERUM\", \"GLU\", \"CA\", \"CAION\", \"MG\", \"PHOS\" in the last 168 hours.    No results for input(s): \"ALT\", \"AST\", \"ALB\", \"AMYLASE\", \"LIPASE\", \"LDH\" in the last 168 hours.    Invalid input(s): \"ALPHOS\", \"TBIL\", \"DBIL\", \"TPROT\"    No results for input(s): \"TROP\" in the last 168 hours.       Radiology: XR FLUOROSCOPY C-ARM TIME LESS THAN 1 HOUR (CPT=76000)    Result Date: 4/26/2024  CONCLUSION: Intraoperative fluoroscopy provided.  Please see surgical note for specific details.     Dictated by (CST): Leandro Anne MD on 4/26/2024 at 12:13 PM     Finalized by (CST): Leandro Anne MD on 4/26/2024 at 12:14 PM             ASSESSMENT / PLAN:   Ms. pollard is a 64 year old female with PMH sig for HTN, HLD, who presents left ankle repair.    Left ankle surgery  - oral and IV meds for pain control wean to oral meds as able  - Adv diet, zofran for nausea, OBR  - PT/OT/SW  - per ortho SCD for DVT prophy  - further management per surgery    HTN  - resume metop    HLD  - continue statin    FN:  - IVF:stop  - Diet:adv    DVT Prophy:scd, per ortho  Lines: PIV    Dispo: pending clinical course    Outpatient records or previous hospital records reviewed.     Further recommendations pending patient's clinical course.  [facility] hospitalist to continue to follow patient while in house    Patient and/or patient's family given opportunity to ask questions and note understanding and agreeing with therapeutic plan as outlined    Thank You,  Ammon Monahan MD    Hospitalist with [facility] Service number: [phone]

## 2024-04-26 NOTE — PLAN OF CARE
Patient alert and oriented x 4. Post op day 0. Pt denied the need for pain medication. Dressing is splint and ace wrap. Up with stand pivot to rolling chair. Pt straight caths herself. Patients diet is general. SCDs for VTE prophylaxis. Vital signs monitored. Cough and deep breathe in addition to incentive spirometer. Bed in lowest position, call light within reach, and non skid socks in place for fall precautions. All needs within reach. Friend at bedside. Rounding done by nursing staff. Plan for discharge is home once cleared.       Problem: Patient Centered Care  Goal: Patient preferences are identified and integrated in the patient's plan of care  Description: Interventions:  - What would you like us to know as we care for you? I straight cath myself at home 5x a day  - Provide timely, complete, and accurate information to patient/family  - Incorporate patient and family knowledge, values, beliefs, and cultural backgrounds into the planning and delivery of care  - Encourage patient/family to participate in care and decision-making at the level they choose  - Honor patient and family perspectives and choices  Outcome: Progressing     Problem: Patient/Family Goals  Goal: Patient/Family Long Term Goal  Description: Patient's Long Term Goal: to be discharged    Interventions:  - get clearance   - See additional Care Plan goals for specific interventions  Outcome: Progressing  Goal: Patient/Family Short Term Goal  Description: Patient's Short Term Goal: manage pain    Interventions:   - monitor and assess pain  - See additional Care Plan goals for specific interventions  Outcome: Progressing     Problem: PAIN - ADULT  Goal: Verbalizes/displays adequate comfort level or patient's stated pain goal  Description: INTERVENTIONS:  - Encourage pt to monitor pain and request assistance  - Assess pain using appropriate pain scale  - Administer analgesics based on type and severity of pain and evaluate response  - Implement  non-pharmacological measures as appropriate and evaluate response  - Consider cultural and social influences on pain and pain management  - Manage/alleviate anxiety  - Utilize distraction and/or relaxation techniques  - Monitor for opioid side effects  - Notify MD/LIP if interventions unsuccessful or patient reports new pain  - Anticipate increased pain with activity and pre-medicate as appropriate  Outcome: Progressing     Problem: RISK FOR INFECTION - ADULT  Goal: Absence of fever/infection during anticipated neutropenic period  Description: INTERVENTIONS  - Monitor WBC  - Administer growth factors as ordered  - Implement neutropenic guidelines  Outcome: Progressing     Problem: SAFETY ADULT - FALL  Goal: Free from fall injury  Description: INTERVENTIONS:  - Assess pt frequently for physical needs  - Identify cognitive and physical deficits and behaviors that affect risk of falls.  - [place] fall precautions as indicated by assessment.  - Educate pt/family on patient safety including physical limitations  - Instruct pt to call for assistance with activity based on assessment  - Modify environment to reduce risk of injury  - Provide assistive devices as appropriate  - Consider OT/PT consult to assist with strengthening/mobility  - Encourage toileting schedule  Outcome: Progressing     Problem: DISCHARGE PLANNING  Goal: Discharge to home or other facility with appropriate resources  Description: INTERVENTIONS:  - Identify barriers to discharge w/pt and caregiver  - Include patient/family/discharge partner in discharge planning  - Arrange for needed discharge resources and transportation as appropriate  - Identify discharge learning needs (meds, wound care, etc)  - Arrange for interpreters to assist at discharge as needed  - Consider post-discharge preferences of patient/family/discharge partner  - Complete POLST form as appropriate  - Assess patient's ability to be responsible for managing their own health  -  Refer to Case Management Department for coordinating discharge planning if the patient needs post-hospital services based on physician/LIP order or complex needs related to functional status, cognitive ability or social support system  Outcome: Progressing

## 2024-04-26 NOTE — OPERATIVE REPORT
Operative Note  Date: 04/26/24     Patient Name: Christina Beal       [id number]    Preoperative Diagnosis:   LEFT ANKLE MAISONNEUVE FRACTURE    Postoperative Diagnosis:  LEFT ANKLE MAISONNEUVE FRACTURE    Procedures:  LEFT ANKLE DISTAL SYNDESMOTIC FIXATION WITH SYNDESMOTIC TIGHTROPE (82202)  LEFT ANKLE DELTOID LIGAMENT REPAIR (87052)    Primary Surgeon: Tyrone Leal MD     Assistant: DESMOND Crump  Assistant necessary for patient positioning, approach, implanting implants, and closure.    Anesthesia: General and Popliteal Block    Implants:  Implant Name Type Inv. Item Serial No.  Lot No. LRB No. Used Action   SYSTEM IMPL 2 H 1.5MM BTTRS PLT TI THRD BB - SN/A  SYSTEM IMPL 2 H 1.5MM BTTRS PLT TI THRD BB N/A [employer] WD [id number] Left 1 Implanted   ANCHOR SUT W/ 1.3MM SUTURETAPE 2 DMND PT - [id number]  ANCHOR SUT W/ 1.3MM SUTURETAPE 2 DMND PT [id number] [employer] WD [id number] Left 1 Implanted       Tourniquet Time: 55 minutes    Complications: None      Operative Summary:     Prior to the preoperative procedure patient was met in the preoperative holding area were all questions were answered and the patient's operative extremity was signed.  Specifically we discussed the indications, risks, benefits, and alternatives to surgery with the patient and she wished to proceed with surgery.  Risk discussed include bleeding, infection, stiffness, nerve damage, specifically superficial peroneal nerve damage, non-healing of the fracture, blood clots, continued pain after surgery, need for future surgery including need for possible syndesmotic screw removal.  The patient understood the risks.    The patient was then brought to the operating room.  General anesthesia was administered by the anesthesia team. The patient was placed in the supine position with the operative lower extremity in the operative field on a wedge. The extremity was then prepped and draped in the usual sterile fashion.  Prior  to the operative procedure a timeout was performed per hospital protocol.  At the conclusion of the timeout an Esmarch was used to exsanguinate the leg and the tourniquet was inflated.    Approximately 5 cm incision centered over the fibular aspect of the distal tibial metaphysis made.  Dissection was carried down through skin and subcutaneous tissue to the distal fibula.  A 2 hole [employer] sideplate for later tight rope was placed on the lateral aspect of the distal fibula and into position.  A K wire was then shot through that plate and into the distal tibial metaphysis taking care to make sure it went through the midportion of the distal tibial metaphysis.    A cannulated drill was used to drill over the wire and then removed and the tight rope was currently tightened.  In an identical manner a second tight rope was classed through the second hole in the plate.  The 2 tight rope were then tightened taking care to ensure that the button was flush against the plate.  X-rays were then taken to ensure good reduction of the syndesmosis.    Next incision over the medial malleolus was made.  Dissection was carried down to the medial talus and deltoid.  The deltoid was found ruptured from the medial malleolus.  1 fiber tack anchor was then placed into the medial malleolus.  The deltoid ligament was then stitched to the medial malleolus.  The repair was found to be very stable.    Closure was then began.  Closure was began by irrigating both the medial and lateral incisions.  Both incisions were closed with 2-0 Vicryl deep and subcutaneous stitches and 3-0 nylon in a horizontal mattress fashion for the skin.  The incisions were then covered with Xeroform followed by 4 x 4's followed by sterile Webril followed by a short leg splint.    The patient was then transferred off the operative table and taken to the PACU in stable condition.      At the conclusion of the procedure all counts were performed and were  correct.    Tyrone Leal MD  04/26/24

## 2024-04-27 VITALS
OXYGEN SATURATION: 100 % | SYSTOLIC BLOOD PRESSURE: 140 MMHG | TEMPERATURE: 98 F | WEIGHT: 205 LBS | DIASTOLIC BLOOD PRESSURE: 100 MMHG | BODY MASS INDEX: 36.32 KG/M2 | RESPIRATION RATE: 18 BRPM | HEIGHT: 63 IN | HEART RATE: 55 BPM

## 2024-04-27 PROCEDURE — 97535 SELF CARE MNGMENT TRAINING: CPT

## 2024-04-27 PROCEDURE — 97530 THERAPEUTIC ACTIVITIES: CPT

## 2024-04-27 PROCEDURE — 97161 PT EVAL LOW COMPLEX 20 MIN: CPT

## 2024-04-27 PROCEDURE — 97165 OT EVAL LOW COMPLEX 30 MIN: CPT

## 2024-04-27 RX ORDER — ASPIRIN 81 MG/1
81 TABLET ORAL DAILY
Status: SHIPPED | COMMUNITY
Start: 2024-04-28

## 2024-04-27 RX ORDER — HYDROCODONE BITARTRATE AND ACETAMINOPHEN 5; 325 MG/1; MG/1
1 TABLET ORAL EVERY 6 HOURS PRN
Qty: 28 TABLET | Refills: 0 | Status: SHIPPED | OUTPATIENT
Start: 2024-04-27

## 2024-04-27 NOTE — PLAN OF CARE
Pt is A&Ox4. RA. Clear for [facility] via medicar.    Problem: Patient Centered Care  Goal: Patient preferences are identified and integrated in the patient's plan of care  Description: Interventions:  - What would you like us to know as we care for you? I straight cath myself at home 5x a day  - Provide timely, complete, and accurate information to patient/family  - Incorporate patient and family knowledge, values, beliefs, and cultural backgrounds into the planning and delivery of care  - Encourage patient/family to participate in care and decision-making at the level they choose  - Honor patient and family perspectives and choices  Outcome: Adequate for Discharge     Problem: Patient/Family Goals  Goal: Patient/Family Long Term Goal  Description: Patient's Long Term Goal: to be discharged    Interventions:  - get clearance   - See additional Care Plan goals for specific interventions  Outcome: Adequate for Discharge  Goal: Patient/Family Short Term Goal  Description: Patient's Short Term Goal: manage pain    Interventions:   - monitor and assess pain  - See additional Care Plan goals for specific interventions  Outcome: Adequate for Discharge     Problem: PAIN - ADULT  Goal: Verbalizes/displays adequate comfort level or patient's stated pain goal  Description: INTERVENTIONS:  - Encourage pt to monitor pain and request assistance  - Assess pain using appropriate pain scale  - Administer analgesics based on type and severity of pain and evaluate response  - Implement non-pharmacological measures as appropriate and evaluate response  - Consider cultural and social influences on pain and pain management  - Manage/alleviate anxiety  - Utilize distraction and/or relaxation techniques  - Monitor for opioid side effects  - Notify MD/LIP if interventions unsuccessful or patient reports new pain  - Anticipate increased pain with activity and pre-medicate as appropriate  Outcome: Adequate for Discharge     Problem: RISK FOR  INFECTION - ADULT  Goal: Absence of fever/infection during anticipated neutropenic period  Description: INTERVENTIONS  - Monitor WBC  - Administer growth factors as ordered  - Implement neutropenic guidelines  Outcome: Adequate for Discharge     Problem: SAFETY ADULT - FALL  Goal: Free from fall injury  Description: INTERVENTIONS:  - Assess pt frequently for physical needs  - Identify cognitive and physical deficits and behaviors that affect risk of falls.  - [place] fall precautions as indicated by assessment.  - Educate pt/family on patient safety including physical limitations  - Instruct pt to call for assistance with activity based on assessment  - Modify environment to reduce risk of injury  - Provide assistive devices as appropriate  - Consider OT/PT consult to assist with strengthening/mobility  - Encourage toileting schedule  Outcome: Adequate for Discharge     Problem: DISCHARGE PLANNING  Goal: Discharge to home or other facility with appropriate resources  Description: INTERVENTIONS:  - Identify barriers to discharge w/pt and caregiver  - Include patient/family/discharge partner in discharge planning  - Arrange for needed discharge resources and transportation as appropriate  - Identify discharge learning needs (meds, wound care, etc)  - Arrange for interpreters to assist at discharge as needed  - Consider post-discharge preferences of patient/family/discharge partner  - Complete POLST form as appropriate  - Assess patient's ability to be responsible for managing their own health  - Refer to Case Management Department for coordinating discharge planning if the patient needs post-hospital services based on physician/LIP order or complex needs related to functional status, cognitive ability or social support system  Outcome: Adequate for Discharge

## 2024-04-27 NOTE — CM/SW NOTE
MDo for transportation home and into the house.    CM attempted to call room and cell, no answer.    CM notified RN of above. Informed her that a medicar is an oop cost, quote is $80. Medicar w/ lift asst placed on will call for the weekend. PCS done.    CM req RN find out how many stairs to get in.    [facility] with lift asst is on will call Sat-Sun, pcs done will need dc date    0900  CM rec'd upd from PT/OT that pt is unable to maintain her wt bearing restriction. They will not be rec dc today unless pt can go to [facility].    CM informed therapy and pt that her OPIB status will not cover [facility]. CM offered C, pt is agreeable. Agrees to [facility] if available.    CM req DSC send C ref, f2f done    Pt aware of medicar costs and is agreeable. She was told by her [employer] provider that transport will be covered. CM advised pt that it may be oop, she will address w/ ins if billed.    1030  [facility] reserved    1230  Pt still insisting on rehab at [place]. CM informed pt again that her ins will likely deny but attempt will be made. Ins auth will not be accessed until Monday.    CM req DSC send PAUL ref, PASRR, CLRC    Will need ins auth for PAUL    1240  CM spoke with pt again. She now wants to dc home today with [facility] and assist from her friend, Kenzie as able.    Pt sts she was only pre approved for 1 day in the hospital and she cannot afford to risk ins denial of coverage for additional days.    [facility] and medical team notified.  [facility]icar set for 4pm    Plan  Home with [facility]     / to remain available for support and/or discharge planning.     Faye Guzman RN    Ext [phone]      / to remain available for support and/or discharge planning.     Faye Guzman RN    Ext [phone]

## 2024-04-27 NOTE — PLAN OF CARE
Patient is Aox4 on RA. SPT to [facility]. POD 1 of L ankle - splint in place. SCDs and ASA. NWB LLE. Neurogenic bladder Hx - patient self straight caths at home every 4 hours per the patient. General diet. LMB 4/25. Plan for Home w/ HHC when med clear.         Problem: Patient Centered Care  Goal: Patient preferences are identified and integrated in the patient's plan of care  Description: Interventions:  - What would you like us to know as we care for you? I straight cath myself at home 5x a day  - Provide timely, complete, and accurate information to patient/family  - Incorporate patient and family knowledge, values, beliefs, and cultural backgrounds into the planning and delivery of care  - Encourage patient/family to participate in care and decision-making at the level they choose  - Honor patient and family perspectives and choices  Outcome: Progressing     Problem: Patient/Family Goals  Goal: Patient/Family Long Term Goal  Description: Patient's Long Term Goal: to be discharged    Interventions:  - get clearance   - See additional Care Plan goals for specific interventions  Outcome: Progressing  Goal: Patient/Family Short Term Goal  Description: Patient's Short Term Goal: manage pain    Interventions:   - monitor and assess pain  - See additional Care Plan goals for specific interventions  Outcome: Progressing     Problem: PAIN - ADULT  Goal: Verbalizes/displays adequate comfort level or patient's stated pain goal  Description: INTERVENTIONS:  - Encourage pt to monitor pain and request assistance  - Assess pain using appropriate pain scale  - Administer analgesics based on type and severity of pain and evaluate response  - Implement non-pharmacological measures as appropriate and evaluate response  - Consider cultural and social influences on pain and pain management  - Manage/alleviate anxiety  - Utilize distraction and/or relaxation techniques  - Monitor for opioid side effects  - Notify MD/LIP if interventions  unsuccessful or patient reports new pain  - Anticipate increased pain with activity and pre-medicate as appropriate  Outcome: Progressing     Problem: RISK FOR INFECTION - ADULT  Goal: Absence of fever/infection during anticipated neutropenic period  Description: INTERVENTIONS  - Monitor WBC  - Administer growth factors as ordered  - Implement neutropenic guidelines  Outcome: Progressing     Problem: SAFETY ADULT - FALL  Goal: Free from fall injury  Description: INTERVENTIONS:  - Assess pt frequently for physical needs  - Identify cognitive and physical deficits and behaviors that affect risk of falls.  - [place] fall precautions as indicated by assessment.  - Educate pt/family on patient safety including physical limitations  - Instruct pt to call for assistance with activity based on assessment  - Modify environment to reduce risk of injury  - Provide assistive devices as appropriate  - Consider OT/PT consult to assist with strengthening/mobility  - Encourage toileting schedule  Outcome: Progressing     Problem: DISCHARGE PLANNING  Goal: Discharge to home or other facility with appropriate resources  Description: INTERVENTIONS:  - Identify barriers to discharge w/pt and caregiver  - Include patient/family/discharge partner in discharge planning  - Arrange for needed discharge resources and transportation as appropriate  - Identify discharge learning needs (meds, wound care, etc)  - Arrange for interpreters to assist at discharge as needed  - Consider post-discharge preferences of patient/family/discharge partner  - Complete POLST form as appropriate  - Assess patient's ability to be responsible for managing their own health  - Refer to Case Management Department for coordinating discharge planning if the patient needs post-hospital services based on physician/LIP order or complex needs related to functional status, cognitive ability or social support system  Outcome: Progressing

## 2024-04-27 NOTE — PHYSICAL THERAPY NOTE
PHYSICAL THERAPY EVALUATION - INPATIENT     Room Number: 424/424-A  Evaluation Date: 4/27/2024  Type of Evaluation: Initial   Physician Order: PT Eval and Treat    Presenting Problem: s/p L ankle syndesmotic reduction and fixation, possible fibular ORIF on 4/26     Reason for Therapy: Mobility Dysfunction and Discharge Planning    PHYSICAL THERAPY ASSESSMENT   Patient is a 64 year old female admitted 4/26/2024 for L ankle syndesmotic reduction and fixation.  Prior to admission, patient's baseline is independent with ADLs and functional mobility without assistive device, was using crutches/RW/knee scooter for the last 1 week while awaiting surgery; admits to being non-compliant with NWB status for LLE.  Patient is currently functioning below baseline with bed mobility, transfers, gait, stair negotiation, standing prolonged periods, and performing household tasks.  Patient is requiring minimal assist as a result of the following impairments: decreased functional strength, impaired dynamic standing balance, difficulty maintaining precautions, and medical status.  Physical Therapy will continue to follow for duration of hospitalization.    Patient will benefit from continued skilled PT Services to promote return to prior level of function and safety with continuous assistance and gradual rehabilitative therapy .    PLAN  PT Treatment Plan: Bed mobility;Body mechanics;Endurance;Energy conservation;Patient education;Gait training;Strengthening;Stair training;Transfer training;Balance training  Rehab Potential : Good  Frequency (Obs): 3-5x/week    PHYSICAL THERAPY MEDICAL/SOCIAL HISTORY     Problem List  Active Problems:    * No active hospital problems. *    HOME SITUATION  Home Situation  Type of Home: Condo  Home Layout: One level  Stairs to Enter : 17  Railing: Yes  Lives With: Alone  Drives: Yes  Patient Owned Equipment: Rolling walker;Crutches;Other (Comment) (knee scooter)  Patient Regularly Uses: None     Prior  Level of [place]: independent     SUBJECTIVE  Agreeable to activity.     PHYSICAL THERAPY EXAMINATION   OBJECTIVE  Precautions: None  Fall Risk: High fall risk    WEIGHT BEARING RESTRICTION  Weight Bearing Restriction: L lower extremity  L Lower Extremity: Non-Weight Bearing    PAIN ASSESSMENT  Ratin    COGNITION  Overall Cognitive Status:  WFL - within functional limits    RANGE OF MOTION AND STRENGTH ASSESSMENT  Upper extremity ROM and strength are within functional limits.  Lower extremity ROM is within functional limits.  Lower extremity strength is within functional limits.    BALANCE  Static Sitting: Good  Dynamic Sitting: Fair +  Static Standing: Fair -  Dynamic Standing: Poor +    ACTIVITY TOLERANCE  BP: 109/72  BP Location: Right arm  BP Method: Automatic  Patient Position: Lying    AM-PAC '6-Clicks' INPATIENT SHORT FORM - BASIC MOBILITY  How much difficulty does the patient currently have...  Patient Difficulty: Turning over in bed (including adjusting bedclothes, sheets and blankets)?: A Little   Patient Difficulty: Sitting down on and standing up from a chair with arms (e.g., wheelchair, bedside commode, etc.): A Little   Patient Difficulty: Moving from lying on back to sitting on the side of the bed?: A Little   How much help from another person does the patient currently need...   Help from Another: Moving to and from a bed to a chair (including a wheelchair)?: A Little   Help from Another: Need to walk in hospital room?: A Little   Help from Another: Climbing 3-5 steps with a railing?: A Lot     AM-PAC Score:  Raw Score: 17   Approx Degree of Impairment: 50.57%   Standardized Score (AM-PAC Scale): 42.13   CMS Modifier (G-Code): CK    FUNCTIONAL ABILITY STATUS  Functional Mobility/Gait Assessment  Gait Assistance: Minimum assistance  Distance (ft): 3  Assistive Device: Rolling walker  Pattern:  (hops on RLE with significant BUE reliance on RW)  Supine to Sit: stand-by assist  Sit to Stand:  minimal assist    Exercise/Education Provided:  Bed mobility  Body mechanics  Energy conservation  Functional activity tolerated  Gait training  Posture  Transfer training    Additional Information: RN approved PT eval. Pt received resting in bed. Introduced self and role. Educated on PT plan of care, goals for today, importance of maintaining NWB LLE with activity. Pt verbalized understanding and is aware of weight bearing restrictions. Pt admits that prior to admission, was difficult for her to maintain NWB and admits to frequent PWB through the LLE while navigating stairs and ADLs. Pt was able to demonstrate bed mobility with SBA, but requires min A x 1 for STS transfers with RW, and for steps to chair with RW. She was able to hop on RLE but requiring min A for balance. No LOB overall. Pt was left sitting in chair with BLE elevated, needs within reach, handoff to RN complete.     The patient's Approx Degree of Impairment: 50.57% has been calculated based on documentation in the [facility] '6 clicks' Inpatient Basic Mobility Short Form.  Research supports that patients with this level of impairment may benefit from a rehab facility.  Final disposition will be made by interdisciplinary medical team.    Patient End of Session: Up in chair;Needs met;Call light within reach;RN aware of session/findings;All patient questions and concerns addressed;Discussed recommendations with /    CURRENT GOALS  Goals to be met by: 5/4/24  Patient Goal Patient's self-stated goal is: go home   Goal #1 Patient is able to demonstrate supine - sit EOB @ level: independent     Goal #1   Current Status    Goal #2 Patient is able to demonstrate transfers Sit to/from Stand at assistance level: supervision with walker - rolling while safely maintaining NWB LLE   Goal #2  Current Status    Goal #3 Patient is able to ambulate 50 feet with assist device: walker - rolling at assistance level: supervision while safely  maintaining NWB LLE   Goal #3   Current Status    Goal #4 Patient will negotiate 4 stairs/one curb w/ assistive device and CGA while safely maintaining NWB LLE   Goal #4   Current Status    Goal #5 Patient to demonstrate independence with home activity/exercise instructions provided to patient in preparation for discharge.   Goal #5   Current Status    Goal #6    Goal #6  Current Status      Patient Evaluation Complexity Level:  History Low - no personal factors and/or co-morbidities   Examination of body systems Low -  addressing 1-2 elements   Clinical Presentation Low- Stable   Clinical Decision Making  Low Complexity     Therapeutic Activity:  15 minutes

## 2024-04-27 NOTE — CM/SW NOTE
SW received message from  asking for HHC ref to be sent    HHC ref sent - needs orders/F2F entered    Agreeable to [facility] if accepted    UPDATE:     [facility] ref sent - then canceled as pt now chooses to go home with HHC per LINDSEY Mckinney    [facility] - sent but can be disregarded    PASRR screen withdrawn    Please see CM 's note for full DC plan    Josie Contreras, LSW, MSW ext. [phone]

## 2024-04-27 NOTE — HOME CARE LIAISON
Received referral via [facility] for Home Health services. Spoke w/ patient who is agreeable with [facility]. Contact information placed on AVS.

## 2024-04-27 NOTE — PROGRESS NOTES
Progress Note     Christina Beal Patient Status:  Outpatient in a Bed    1960 MRN [id number]   Location [facility] 4W/SW/SE Attending Leal, MD Tyrone   Hosp Day # 0 PCP Charlotte Nagy MD     Chief Complaint: left ankle pain, status left ankle syndesmotic fixation and deltoid ligament repair.     Subjective:   S: Patient is currently sitting in bedside chair, with lower extremities elevated. Pain is controlled. She endorses that she still has some numbness in the left ankle an foot, attributed to nerve block given from surgery yesterday.  She denies any chest pain, shortness, calf pain, f/c/n/v.  Patient indicates that she worked with PT this morning and tolerated sessions well. She is a bit anxious and concerned about the amount of stairs and the two landings that she has to go up in order to get inside of her condo. Patient is wanting to know if insurance will cover lift/transport services to get her inside of her home once returning home from acute hospital stay. She indicates that she is okay with home health services, but wants to make sure she can get into her home safely. Patient is aware that PAUL is not covered by her insurance with the type of 24 observation hospital admission she was placed under. No other issues or concerns.    Review of Systems:   10 point ROS completed and was negative, except for pertinent positive and negatives stated in subjective.    Objective:   Vital signs:  Temp:  [97.6 °F (36.4 °C)-98.8 °F (37.1 °C)] 98.8 °F (37.1 °C)  Pulse:  [55-70] 61  Resp:  [11-35] 18  BP: (106-161)/(51-74) 109/72  SpO2:  [95 %-100 %] 97 %    Wt Readings from Last 3 Encounters:   24 205 lb (93 kg)   22 215 lb (97.5 kg)   21 214 lb (97.1 kg)       Intake/Output:    Intake/Output Summary (Last 24 hours) at 2024 0917  Last data filed at 2024 0832  Gross per 24 hour   Intake 900 ml   Output 1225 ml   Net -325 ml         Physical Exam:    General: No acute distress.  Alert and oriented x 4. Calm and cooperative.  Left Ankle  INSPECTION:  Splint immobilization with surrounding ace-bandage currently in place to left ankle. No signs of strike through drainage appreciated.   NEUROVASCULAR:  Unable to wiggle toes. Subjective paresthesias to the left ankle/foot. Gross perfusion of the digits of the foot appreciated. Brisk capillary refill.     Results:   Diagnostic Data:      Labs:    Selected labs - last 24 hours:  Endo  Lytes  Renal   Glu -  Na - Ca -  BUN -   POC Gluc  -  K - PO4 -  Cr -   A1c -  Cl - Mg -  eGFR -   TSH -  CO2 -         LFT  CBC  Other   AST -  WBC -  PTT - Procal -   ALT -  Hb -  INR - CRP -   APk -  Hct -  Trop - D dim -   T blessing -  PLT -  pBNP -  BNP -  - Ferritin  -   Prot -    CK  - Lactate  -   Alb -    LDL  - COVID  -     Imaging: Imaging data reviewed in Epic.    Medications:    metoprolol succinate ER  75 mg Oral Daily    cholecalciferol  2,000 Units Oral Daily    sennosides  17.2 mg Oral Nightly    docusate sodium  100 mg Oral BID    multivitamin  1 tablet Oral Daily    rosuvastatin  10 mg Oral Nightly    aspirin  81 mg Oral Daily       Assessment & Plan:   ASSESSMENT / PLAN:     POD#1    65 y/o female who is status post left ankle syndesmotic fixation and deltoid ligament repair of left ankle maisonneuve fracture on 4/26/2024 by Dr. Leal.  Afebrile, vitals stable.     - continue with pain control, Norco on board  - ASA 81 mg daily for DVT prophylaxis  - NWB on LLE  - PT/OT  - Dispo: Anticipate possible discharge home later today vs tomorrow, pending medically cleared and PT recs. Patient is stable from and Orthopedic standpoint for discharge. She will need to follow-up with Dr. Leal at 2 weeks post-op for re-evaluation.      DESMOND Walter

## 2024-04-27 NOTE — DISCHARGE SUMMARY
General Medicine Discharge Summary     Patient ID:  Christina Beal  64 year old  4/2/1960    Admit date: 4/26/2024    Discharge date and time: 4/27/2024    Attending Physician: Tyrone Leal MD     Consults: IP CONSULT TO HOSPITALIST  IP CONSULT TO CASE MANAGEMENT    Primary Care Physician: Charlotte Nagy MD     Reason for admission: left lower ankle surgery    Risk For Readmission: low    Discharge Diagnoses: Closed displaced maisonneuve fracture left lower extremity, bony abnormality  See Additional Discharge Diagnoses in Hospital Course    Discharged Condition: good    Follow-up with labs/images appointments: FU with ortho    Exam  Gen: No acute distress  Pulm: Lungs clear, normal respiratory effort  CV: Heart with regular rate and rhythm  Abd: Abdomen soft,   EXT: no edema     HPI:   History of Present Illness: Ms. beal is a 64 year old female with PMH sig for HTN, HLD, who presents left ankle repair.  Patient is currently post op denies pain, no Cp/sob, no n/v/d, no f/c/ns.        Hospital Course:   Ms. beal is a 64 year old female with PMH sig for HTN, HLD, who presents left ankle repair, post op course uncomplicated, cleared by surgery for DC home with [facility], seen by PT, rec PAUL, SW states patient does not qualify but offered to try to clear with insurance, patient declined, wanting to go home, plan for DC home with [facility], patients friend to help her at home, fu with ortho as directed.        Operative Procedures: Procedure(s) (LRB):  Left ankle syndesmotic reduction and fixation, possible fibula open reduction internal fixation (Left)     Imaging: XR FLUOROSCOPY C-ARM TIME LESS THAN 1 HOUR (CPT=76000)    Result Date: 4/26/2024  CONCLUSION: Intraoperative fluoroscopy provided.  Please see surgical note for specific details.     Dictated by (CST): Leandro Anne MD on 4/26/2024 at 12:13 PM     Finalized by (CST): Leandro Anne MD on 4/26/2024 at 12:14 PM             Disposition: home    Activity:  activity as tolerated  Diet: regular diet  Wound Care: as directed  Code Status: Full Code  O2: none    Home Medication Changes:     Med list     Medication List        START taking these medications      aspirin 81 MG Tbec  Start taking on: April 28, 2024     HYDROcodone-acetaminophen 5-325 MG Tabs  Commonly known as: Norco  Take 1 tablet by mouth every 6 (six) hours as needed for Pain.            CHANGE how you take these medications      Vitamin D 50 MCG (2000 UT) Tabs  Take 2000U daily  What changed: when to take this            CONTINUE taking these medications      acetaminophen 500 MG Tabs  Commonly known as: Tylenol Extra Strength     Calcium Carbonate 600 MG Tabs     IBUPROFEN OR     metoprolol succinate ER 50 MG Tb24  Commonly known as: Toprol XL  Take 1.5 tablets (75 mg total) by mouth daily.     Multi-Vitamin Tabs     rosuvastatin 10 MG Tabs  Commonly known as: Crestor               Where to Get Your Medications        These medications were sent to [id number] - [place], [place] - [address] [phone], [phone]  [address] [place] [address]      Phone: [phone]   HYDROcodone-acetaminophen 5-325 MG Tabs         FU   Follow-up Information       Charlotte Nagy MD .    Specialty: Internal Medicine  Contact information:  [address]  [address]  [facility] [address] [phone]               Tyrone Leal MD. Schedule an appointment as soon as possible for a visit in 1 week(s).    Specialty: SURGERY, ORTHOPEDIC  Contact information:  [address]  [address]  [facility] [id number] [phone]                             DC instructions:      Other Discharge Instructions:           Home Care Instructions Following Your Ankle Fixation       Weight bearing status  You will be non-weight bearing on your operative extremity for the next 8 weeks.  You will use crutches for ambulation assistance while not weight bearing.  Crutches will be provided to you from the  hospital.    Splint instructions  Remain in your splint at all times until removed at your 1st post operative visit.  Keep splint clean and dry.  You cannot shower until after your 1st post operative visit.    Wound Care  The following instructions will promote proper healing and help to prevent infection.  Our office staff will remove your splint and sutures at your first post-operative appointment:   You can shower and get the incisions wet after your 1st post operative visit. Pat dry after your shower.  No submerging incisions under water for  at least 4 weeks after surgery and until after cleared by Dr. Leal.    Cold Therapy  Cold therapy will help minimize swelling, improve, comfort, and limit the need for pain medication.  Apply ice 2-3 times daily for the 1st 2 weeks after surgery.  The ice bag should never come in direct contact with the skin, place a towel between the ice bag and your skin.  Immediately contact Dr. Leal if you experience excruciating pain not helped by pain medication, burning, blisters, redness, discoloration, or other changes in skin appearance.    Pain Medication: Norco  Norco 5/325mg: Take one or two tablets every six hours as needed for pain.  Do not exceed 10 (ten) tablets each day.  Do not take Norco, if you do not have pain.  You may take NSAIDs (Advil, Aleve) to assist with pain control if the Norco is not giving you maximum pain relief. It is okay to take NSAIDs with Norco.  You should not take Tylenol with Norco, as Norco already contains Tylenol, and too much Tylenol can be very dangerous for your body.    Deep Vein Thrombosis (DVT) Prophylaxis  You are at increased risk for developing a DVT or lower extremity blood clot after a lower extremity surgery.   Signs and symptoms of a DVT include calf swelling, calf pain, and calf tenderness to palpation.  If you experience severe calf pain, calf tenderness or calf swelling, you should go to the nearest urgent care or emergency room  immediately for a STAT ultrasound to assess for a blood clot.  Keys to prevention of  DVT are performing ankle pumps (moving your ankle in an up and down motion) multiple times throughout the day for the first 1-2 weeks post-op.  You will be prescribed Aspirin 81mg daily post operatively.  You should take this daily for the first 6 weeks after surgery.    Home Medication  Resume your home medications as instructed.    Diet  Resume your normal diet.    Activity  No strenuous activity. Again, you will be non-weight bearing post-operatively.   Use your crutches while non-weight bearing and while in your splint.  You can go up and down the stairs as tolerated while in your splint. Use common sense.  You cannot return to work, if your work requires strenuous activity with your legs.   You cannot return to any sports or exercises involving your legs until Dr. Leal gives you permission.    Driving  Do not drive if you are taking pain medication. Do not drive while in the splint and using crutches.    Follow-up Appointment with Dr. Leal  You were likely given a postoperative appointment with Dr. Leal at the time your operation was scheduled.  Call Dr. Leal's office today for an appointment. You should be seen 10-14 days after your surgery. Call either [phone] for an appointment.  Verify your appointment date, day, time, and location.  At your 1st postoperative office visit: Your wounds will be evaluated, sutures removed, healing assessed, physical therapy will be ordered and scheduled and any additional concerns and instructions will be discussed.    Questions or Concerns  Call Dr. Leal's office if you experience severe pain not controlled by pain medication, swelling, numbness, tingling, bleeding, fever, or other concerns.   If your call is made after office hours, a physician's assistant or nurse practitioner will be available to help you. There is always a surgeon covering Dr. Leal's patients, if he is  unavailable.              I reconciled current and discharge medications on day of discharge, discussed changes with patient and noted changes above.       Total Time Coordinating Care: Greater than 30 minutes    Patient had opportunity to ask questions and state understand and agree with therapeutic plan as outlined    Thank You,    Ammon Monahan MD   Hospitalist with [facility]

## 2024-04-27 NOTE — OCCUPATIONAL THERAPY NOTE
OCCUPATIONAL THERAPY EVALUATION - INPATIENT     Room Number: 424/424-A  Evaluation Date: 4/27/2024  Type of Evaluation: Initial  Presenting Problem: L ankle fixation; NWB    Physician Order: IP Consult to Occupational Therapy  Reason for Therapy: ADL/IADL Dysfunction and Discharge Planning    OCCUPATIONAL THERAPY ASSESSMENT   Patient is a 64 year old female admitted 4/26/2024 for L ankle fixation; NWB.  Prior to admission, patient's baseline is independent; was managing at home prior to surgery on broken ankle but attests to not adhering to NWB.  Patient is currently functioning below baseline with self care and transfers.  Patient is requiring up to Min a as a result of the following impairments: balance, safety, adhernence to NWB. Occupational Therapy will continue to follow for duration of hospitalization.    Patient will benefit from continued skilled OT Services to promote return to prior level of function and safety with continuous assistance and gradual rehabilitative therapy     PLAN  OT Treatment Plan: Balance activities;Energy conservation/work simplification techniques;ADL training;Functional transfer training;Endurance training;Patient/Family training;Patient/Family education;Compensatory technique education  OT Device Recommendations: Transfer tub bench; Raised toilet seat; Grab bars    OCCUPATIONAL THERAPY MEDICAL/SOCIAL HISTORY   Problem List   Active Problems:    * No active hospital problems. *    HOME SITUATION  Type of Home: Condo  Home Layout: One level  Lives With: Alone  Toilet and Equipment: Standard height toilet  Shower/Tub and Equipment: Tub-shower combo  Drives: Yes  Patient Regularly Uses: None        SUBJECTIVE  I was cheating quite a bit; but just a little so I could manage at home.     OCCUPATIONAL THERAPY EXAMINATION   OBJECTIVE  Precautions: None  Fall Risk: High fall risk    WEIGHT BEARING RESTRICTION  L Lower Extremity: Non-Weight Bearing    PAIN ASSESSMENT  Rating: -- (No reports  of pain)    ACTIVITIES OF DAILY LIVING ASSESSMENT  AM-PAC ‘6-Clicks’ Inpatient Daily Activity Short Form  How much help from another person does the patient currently need…  -   Putting on and taking off regular lower body clothing?: A Little  -   Bathing (including washing, rinsing, drying)?: A Little  -   Toileting, which includes using toilet, bedpan or urinal? : A Lot  -   Putting on and taking off regular upper body clothing?: A Little  -   Taking care of personal grooming such as brushing teeth?: A Little  -   Eating meals?: A Little    AM-PAC Score:  Score: 17  Approx Degree of Impairment: 50.11%  Standardized Score (AM-PAC Scale): 37.26  CMS Modifier (G-Code): CK    FUNCTIONAL TRANSFER ASSESSMENT  Sit to Stand: Edge of Bed  Edge of Bed: Minimal Assist  Toilet Transfer: Minimal Assist    BED MOBILITY  Rolling: Stand-by Assist  Supine to Sit : Stand-by Assist  Scooting: SBA    BALANCE ASSESSMENT  Static Sitting: Stand-by Assist  Static Standing: Minimal Assist    FUNCTIONAL ADL ASSESSMENT  Eating: Supervision  Grooming Seated: Stand-by Assist  Bathing Seated: Minimal Assist  UB Dressing Seated: Stand-by Assist  LB Dressing Seated: Contact Guard Assist  Toileting Seated: Minimal Assist    THERAPEUTIC EXERCISE     Skilled Therapy Provided: TF training, ADL compensation; safety with return to home    EDUCATION PROVIDED  Patient: Role of Occupational Therapy; Plan of Care; Discharge Recommendations; Fall Prevention; Surgical Precautions; Weight Bear Status; Posture/Positioning; Compensatory ADL Techniques; Energy Conservation  Patient's Response to Education: Verbalized Understanding; Requires Further Education    The patient's Approx Degree of Impairment: 50.11% has been calculated based on documentation in the [facility] '6 clicks' Inpatient Daily Activity Short Form.  Research supports that patients with this level of impairment may benefit from GR.  Final disposition will be made by interdisciplinary medical  team.    Patient End of Session: Up in chair;Needs met;Call light within reach;RN aware of session/findings;All patient questions and concerns addressed    OT Goals  Patient self-stated goal is: to return home when safe     Patient will complete LE dressing with supervisoin  Comment:     Patient will complete toilet transfer with supervision  Comment:     Patient will complete self care task at sink level with supervision   Comment:     Comment:         Goals  on: 5/15  Frequency:3x week     Patient Evaluation Complexity Level:   Occupational Profile/Medical History LOW   Specific performance deficits impacting engagement in ADL/IADL LOW  1 - 3 performance deficits    Client Assessment/Performance Deficits LOW - No comorbidities nor modifications of tasks    Clinical Decision Making LOW - Analysis of occupational profile, problem-focused assessments, limited treatment options    Overall Complexity LOW     OT Session Time: 10 minutes  Self-Care Home Management: 10 minutes    Karthik Griffin, Occupational Therapist, OTR/L ext [phone]

## (undated) DEVICE — DRAPE C-ARM UNIVERSAL

## (undated) DEVICE — STERILE POLYISOPRENE POWDER-FREE SURGICAL GLOVES: Brand: PROTEXIS

## (undated) DEVICE — SUPER SPONGES,MEDIUM: Brand: KERLIX

## (undated) DEVICE — INTENDED FOR TISSUE SEPARATION, AND OTHER PROCEDURES THAT REQUIRE A SHARP SURGICAL BLADE TO PUNCTURE OR CUT.: Brand: BARD-PARKER ® STAINLESS STEEL BLADES

## (undated) DEVICE — WIRE K SYNT 1.6 THR 292.71: Type: IMPLANTABLE DEVICE | Site: HUMERUS

## (undated) DEVICE — TETRA-FLEX CF WOVEN LATEX FREE ELASTIC BANDAGE 6" X 5.5 YD: Brand: TETRA-FLEX™CF

## (undated) DEVICE — SUTURE VICRYL 2-0 FSL

## (undated) DEVICE — ESSENTIAL SHOULDER SLING L

## (undated) DEVICE — GLOVE SURG SENSICARE SZ 8-1/2

## (undated) DEVICE — DRESSING AQUACEL AG 3.5 X 10

## (undated) DEVICE — SUT ETHLN 3-0 30IN FS-1 NABSRB BLK 24MM 3/8 C

## (undated) DEVICE — 3M™ IOBAN™ 2 ANTIMICROBIAL INCISE DRAPE 6650EZ: Brand: IOBAN™ 2

## (undated) DEVICE — TOWEL: OR BLU 80/CS: Brand: MEDICAL ACTION INDUSTRIES

## (undated) DEVICE — DRILL BIT SYNT 2.8X165 310.288

## (undated) DEVICE — GOWN SUR 3XL LEV 3 BLU W/ GRN NK BND AERO BLU

## (undated) DEVICE — GOWN,SIRUS,FABRIC-REINFORCED,X-LARGE: Brand: MEDLINE

## (undated) DEVICE — SUT COAT VCRL 0 27IN CP-1 ABSRB UD 36MM 1/2

## (undated) DEVICE — DISPOSABLE TOURNIQUET CUFF SINGLE BLADDER, DUAL PORT AND QUICK CONNECT CONNECTOR: Brand: COLOR CUFF

## (undated) DEVICE — CONVERTORS STOCKINETTE: Brand: CONVERTORS

## (undated) DEVICE — GAMMEX® PI HYBRID SIZE 7, STERILE POWDER-FREE SURGICAL GLOVE, POLYISOPRENE AND NEOPRENE BLEND: Brand: GAMMEX

## (undated) DEVICE — LAPAROTOMY SPONGE - RF AND X-RAY DETECTABLE PRE-WASHED: Brand: SITUATE

## (undated) DEVICE — WIRE K SYNT 2.0 292.20: Type: IMPLANTABLE DEVICE | Site: HUMERUS

## (undated) DEVICE — KENDALL SCD EXPRESS SLEEVES, KNEE LENGTH, MEDIUM: Brand: KENDALL SCD

## (undated) DEVICE — ABDOMINAL PAD: Brand: DERMACEA

## (undated) DEVICE — COTTON UNDERCAST PADDING,REGULAR FINISH: Brand: WEBRIL

## (undated) DEVICE — UPPER EXTREMITY CDS-LF: Brand: MEDLINE INDUSTRIES, INC.

## (undated) DEVICE — SUT VCRL 2-0 27IN FS-1 ABSRB UD L24MM 3/8 CIR

## (undated) DEVICE — DRILL BIT SYNT 2.5X110 310.25

## (undated) DEVICE — GAMMEX® PI HYBRID SIZE 7.5, STERILE POWDER-FREE SURGICAL GLOVE, POLYISOPRENE AND NEOPRENE BLEND: Brand: GAMMEX

## (undated) DEVICE — STANDARD HYPODERMIC NEEDLE,POLYPROPYLENE HUB: Brand: MONOJECT

## (undated) DEVICE — PROXIMATE SKIN STAPLERS (35 WIDE) CONTAINS 35 STAINLESS STEEL STAPLES (FIXED HEAD): Brand: PROXIMATE

## (undated) DEVICE — SCREW LOCKING 3.5X32 212.112
Type: IMPLANTABLE DEVICE | Site: HUMERUS | Status: NON-FUNCTIONAL
Removed: 2018-03-13

## (undated) DEVICE — C-ARM: Brand: UNBRANDED

## (undated) DEVICE — SUTURE VICRYL 0 CP-1

## (undated) DEVICE — SOLUTION IRRIG 1000ML ST H2O AQUALITE PLAS

## (undated) DEVICE — PACK CDS LOWER EXTREMITY

## (undated) DEVICE — APPLICATOR PREP 26ML CHG 2% ISO ALC 70%

## (undated) DEVICE — SOLUTION IRRIG 1000ML 0.9% NACL USP BTL

## (undated) DEVICE — SYRINGE 30ML LL TIP

## (undated) DEVICE — SPONGE GZ 4X4IN COT 12 PLY TYP VII WVN C

## (undated) DEVICE — Device

## (undated) DEVICE — SOL  .9 1000ML BTL

## (undated) DEVICE — GAMMEX® PI HYBRID SIZE 9, STERILE POWDER-FREE SURGICAL GLOVE, POLYISOPRENE AND NEOPRENE BLEND: Brand: GAMMEX

## (undated) NOTE — LETTER
[facility]  [address], [place], [place]  Authorization for Surgical Operation and Procedure                                                                                           I hereby authorize Tyrone Leal MD, my physician and his/her assistants (if applicable), which may include medical students, residents, and/or fellows, to perform the following surgical operation/ procedure and administer such anesthesia as may be determined necessary by my physician: Operation/Procedure name (s) Left ankle syndesmotic reduction and fixation, possible fibula open reduction and internal fixation on Christina Beal   2.   I recognize that during the surgical operation/procedure, unforeseen conditions may necessitate additional or different procedures than those listed above.  I, therefore, further authorize and request that the above-named surgeon, assistants, or designees perform such procedures as are, in their judgment, necessary and desirable.    3.   My surgeon/physician has discussed prior to my surgery the potential benefits, risks and side effects of this procedure; the likelihood of achieving goals; and potential problems that might occur during recuperation.  They also discussed reasonable alternatives to the procedure, including risks, benefits, and side effects related to the alternatives and risks related to not receiving this procedure.  I have had all my questions answered and I acknowledge that no guarantee has been made as to the result that may be obtained.    4.   Should the need arise during my operation/procedure, which includes change of level of care prior to discharge, I also consent to the administration of blood and/or blood products.  Further, I understand that despite careful testing and screening of blood or blood products by collecting agencies, I may still be subject to ill effects as a result of receiving a blood transfusion and/or blood products.  The following are  some, but not all, of the potential risks that can occur: fever and allergic reactions, hemolytic reactions, transmission of diseases such as Hepatitis, AIDS and Cytomegalovirus (CMV) and fluid overload.  In the event that I wish to have an autologous transfusion of my own blood, or a directed donor transfusion, I will discuss this with my physician.  Check only if Refusing Blood or Blood Products  I understand refusal of blood or blood products as deemed necessary by my physician may have serious consequences to my condition to include possible death. I hereby assume responsibility for my refusal and release the hospital, its personnel, and my physicians from any responsibility for the consequences of my refusal.    o  Refuse   5.   I authorize the use of any specimen, organs, tissues, body parts or foreign objects that may be removed from my body during the operation/procedure for diagnosis, research or teaching purposes and their subsequent disposal by hospital authorities.  I also authorize the release of specimen test results and/or written reports to my treating physician on the hospital medical staff or other referring or consulting physicians involved in my care, at the discretion of the Pathologist or my treating physician.    6.   I consent to the photographing or videotaping of the operations or procedures to be performed, including appropriate portions of my body for medical, scientific, or educational purposes, provided my identity is not revealed by the pictures or by descriptive texts accompanying them.  If the procedure has been photographed/videotaped, the surgeon will obtain the original picture, image, videotape or CD.  The hospital will not be responsible for storage, release or maintenance of the picture, image, tape or CD.    7.   I consent to the presence of a  or observers in the operating room as deemed necessary by my physician or their designees.    8.   I recognize that  in the event my procedure results in extended X-Ray/fluoroscopy time, I may develop a skin reaction.    9. If I have a Do Not Attempt Resuscitation (DNAR) order in place, that status will be suspended while in the operating room, procedural suite, and during the recovery period unless otherwise explicitly stated by me (or a person authorized to consent on my behalf). The surgeon or my attending physician will determine when the applicable recovery period ends for purposes of reinstating the DNAR order.  10. Patients having a sterilization procedure: I understand that if the procedure is successful the results will be permanent and it will therefore be impossible for me to inseminate, conceive, or bear children.  I also understand that the procedure is intended to result in sterility, although the result has not been guaranteed.   11. I acknowledge that my physician has explained sedation/analgesia administration to me including the risk and benefits I consent to the administration of sedation/analgesia as may be necessary or desirable in the judgment of my physician.    I CERTIFY THAT I HAVE READ AND FULLY UNDERSTAND THE ABOVE CONSENT TO OPERATION and/or OTHER PROCEDURE.     _________________________________________ _________________________________     ___________________________________  Signature of Patient     Signature of Responsible Person                   Printed Name of Responsible Person                              _________________________________________ ______________________________        ___________________________________  Signature of Witness         Date  Time         Relationship to Patient    STATEMENT OF PHYSICIAN My signature below affirms that prior to the time of the procedure; I have explained to the patient and/or his/her legal representative, the risks and benefits involved in the proposed treatment and any reasonable alternative to the proposed treatment. I have also explained the risks  and benefits involved in refusal of the proposed treatment and alternatives to the proposed treatment and have answered the patient's questions. If I have a significant financial interest in a co-management agreement or a significant financial interest in any product or implant, or other significant relationship used in this procedure/surgery, I have disclosed this and had a discussion with my patient.     _______________________________________________________________ _____________________________  (Signature of Physician)                                                                                         (Date)                                   (Time)  Patient Name: Christina Beal    : 1960   Printed: 2024      Medical Record #: [id number]                                              Page 1 of 1

## (undated) NOTE — LETTER
[facility]  [address], [place], [place]  Authorization for Surgical Operation and Procedure                                                                                           I hereby authorize Tyrone Leal MD, my physician and his/her assistants (if applicable), which may include medical students, residents, and/or fellows, to perform the following surgical operation/ procedure and administer such anesthesia as may be determined necessary by my physician: Operation/Procedure name (s) Left ankle syndesmotic reduction and fixation, possible fibula open on Christina Beal   2.   I recognize that during the surgical operation/procedure, unforeseen conditions may necessitate additional or different procedures than those listed above.  I, therefore, further authorize and request that the above-named surgeon, assistants, or designees perform such procedures as are, in their judgment, necessary and desirable.    3.   My surgeon/physician has discussed prior to my surgery the potential benefits, risks and side effects of this procedure; the likelihood of achieving goals; and potential problems that might occur during recuperation.  They also discussed reasonable alternatives to the procedure, including risks, benefits, and side effects related to the alternatives and risks related to not receiving this procedure.  I have had all my questions answered and I acknowledge that no guarantee has been made as to the result that may be obtained.    4.   Should the need arise during my operation/procedure, which includes change of level of care prior to discharge, I also consent to the administration of blood and/or blood products.  Further, I understand that despite careful testing and screening of blood or blood products by collecting agencies, I may still be subject to ill effects as a result of receiving a blood transfusion and/or blood products.  The following are some, but not all, of the  potential risks that can occur: fever and allergic reactions, hemolytic reactions, transmission of diseases such as Hepatitis, AIDS and Cytomegalovirus (CMV) and fluid overload.  In the event that I wish to have an autologous transfusion of my own blood, or a directed donor transfusion, I will discuss this with my physician.  Check only if Refusing Blood or Blood Products  I understand refusal of blood or blood products as deemed necessary by my physician may have serious consequences to my condition to include possible death. I hereby assume responsibility for my refusal and release the hospital, its personnel, and my physicians from any responsibility for the consequences of my refusal.    o  Refuse   5.   I authorize the use of any specimen, organs, tissues, body parts or foreign objects that may be removed from my body during the operation/procedure for diagnosis, research or teaching purposes and their subsequent disposal by hospital authorities.  I also authorize the release of specimen test results and/or written reports to my treating physician on the hospital medical staff or other referring or consulting physicians involved in my care, at the discretion of the Pathologist or my treating physician.    6.   I consent to the photographing or videotaping of the operations or procedures to be performed, including appropriate portions of my body for medical, scientific, or educational purposes, provided my identity is not revealed by the pictures or by descriptive texts accompanying them.  If the procedure has been photographed/videotaped, the surgeon will obtain the original picture, image, videotape or CD.  The hospital will not be responsible for storage, release or maintenance of the picture, image, tape or CD.    7.   I consent to the presence of a  or observers in the operating room as deemed necessary by my physician or their designees.    8.   I recognize that in the event my procedure  results in extended X-Ray/fluoroscopy time, I may develop a skin reaction.    9. If I have a Do Not Attempt Resuscitation (DNAR) order in place, that status will be suspended while in the operating room, procedural suite, and during the recovery period unless otherwise explicitly stated by me (or a person authorized to consent on my behalf). The surgeon or my attending physician will determine when the applicable recovery period ends for purposes of reinstating the DNAR order.  10. Patients having a sterilization procedure: I understand that if the procedure is successful the results will be permanent and it will therefore be impossible for me to inseminate, conceive, or bear children.  I also understand that the procedure is intended to result in sterility, although the result has not been guaranteed.   11. I acknowledge that my physician has explained sedation/analgesia administration to me including the risk and benefits I consent to the administration of sedation/analgesia as may be necessary or desirable in the judgment of my physician.    I CERTIFY THAT I HAVE READ AND FULLY UNDERSTAND THE ABOVE CONSENT TO OPERATION and/or OTHER PROCEDURE.     _________________________________________ _________________________________     ___________________________________  Signature of Patient     Signature of Responsible Person                   Printed Name of Responsible Person                              _________________________________________ ______________________________        ___________________________________  Signature of Witness         Date  Time         Relationship to Patient    STATEMENT OF PHYSICIAN My signature below affirms that prior to the time of the procedure; I have explained to the patient and/or his/her legal representative, the risks and benefits involved in the proposed treatment and any reasonable alternative to the proposed treatment. I have also explained the risks and benefits involved in  refusal of the proposed treatment and alternatives to the proposed treatment and have answered the patient's questions. If I have a significant financial interest in a co-management agreement or a significant financial interest in any product or implant, or other significant relationship used in this procedure/surgery, I have disclosed this and had a discussion with my patient.     _______________________________________________________________ _____________________________  (Signature of Physician)                                                                                         (Date)                                   (Time)  Patient Name: Christina Beal    : 1960   Printed: 2024      Medical Record #: [id number]                                              Page 1 of 1